# Patient Record
Sex: FEMALE | URBAN - METROPOLITAN AREA
[De-identification: names, ages, dates, MRNs, and addresses within clinical notes are randomized per-mention and may not be internally consistent; named-entity substitution may affect disease eponyms.]

---

## 2022-08-30 ENCOUNTER — LAB REQUISITION (OUTPATIENT)
Dept: LAB | Facility: CLINIC | Age: 67
End: 2022-08-30

## 2022-08-30 DIAGNOSIS — E11.42 TYPE 2 DIABETES MELLITUS WITH DIABETIC POLYNEUROPATHY (H): ICD-10-CM

## 2022-08-30 DIAGNOSIS — E66.01 MORBID (SEVERE) OBESITY DUE TO EXCESS CALORIES (H): ICD-10-CM

## 2022-08-30 DIAGNOSIS — I50.23 ACUTE ON CHRONIC SYSTOLIC (CONGESTIVE) HEART FAILURE (H): ICD-10-CM

## 2022-09-02 LAB
ANION GAP SERPL CALCULATED.3IONS-SCNC: 10 MMOL/L (ref 7–15)
BUN SERPL-MCNC: 41.7 MG/DL (ref 8–23)
CALCIUM SERPL-MCNC: 11.1 MG/DL (ref 8.8–10.2)
CHLORIDE SERPL-SCNC: 98 MMOL/L (ref 98–107)
CREAT SERPL-MCNC: 1.5 MG/DL (ref 0.51–0.95)
DEPRECATED HCO3 PLAS-SCNC: 25 MMOL/L (ref 22–29)
ERYTHROCYTE [DISTWIDTH] IN BLOOD BY AUTOMATED COUNT: 22.1 % (ref 10–15)
GFR SERPL CREATININE-BSD FRML MDRD: 38 ML/MIN/1.73M2
GLUCOSE SERPL-MCNC: 117 MG/DL (ref 70–99)
HCT VFR BLD AUTO: 35.4 % (ref 35–47)
HGB BLD-MCNC: 10 G/DL (ref 11.7–15.7)
MCH RBC QN AUTO: 20.6 PG (ref 26.5–33)
MCHC RBC AUTO-ENTMCNC: 28.2 G/DL (ref 31.5–36.5)
MCV RBC AUTO: 73 FL (ref 78–100)
PLATELET # BLD AUTO: 355 10E3/UL (ref 150–450)
POTASSIUM SERPL-SCNC: 4.8 MMOL/L (ref 3.4–5.3)
RBC # BLD AUTO: 4.85 10E6/UL (ref 3.8–5.2)
SODIUM SERPL-SCNC: 133 MMOL/L (ref 136–145)
WBC # BLD AUTO: 4 10E3/UL (ref 4–11)

## 2022-09-02 PROCEDURE — 85018 HEMOGLOBIN: CPT | Performed by: NURSE PRACTITIONER

## 2022-09-02 PROCEDURE — 36415 COLL VENOUS BLD VENIPUNCTURE: CPT | Performed by: NURSE PRACTITIONER

## 2022-09-02 PROCEDURE — P9604 ONE-WAY ALLOW PRORATED TRIP: HCPCS | Performed by: NURSE PRACTITIONER

## 2022-09-02 PROCEDURE — 82310 ASSAY OF CALCIUM: CPT | Performed by: NURSE PRACTITIONER

## 2022-09-03 ENCOUNTER — LAB REQUISITION (OUTPATIENT)
Dept: LAB | Facility: CLINIC | Age: 67
End: 2022-09-03

## 2022-09-03 DIAGNOSIS — I12.9 HYPERTENSIVE CHRONIC KIDNEY DISEASE WITH STAGE 1 THROUGH STAGE 4 CHRONIC KIDNEY DISEASE, OR UNSPECIFIED CHRONIC KIDNEY DISEASE: ICD-10-CM

## 2022-09-06 LAB
ANION GAP SERPL CALCULATED.3IONS-SCNC: 11 MMOL/L (ref 7–15)
BUN SERPL-MCNC: 47.6 MG/DL (ref 8–23)
CALCIUM SERPL-MCNC: 11.5 MG/DL (ref 8.8–10.2)
CHLORIDE SERPL-SCNC: 100 MMOL/L (ref 98–107)
CREAT SERPL-MCNC: 1.37 MG/DL (ref 0.51–0.95)
DEPRECATED HCO3 PLAS-SCNC: 26 MMOL/L (ref 22–29)
GFR SERPL CREATININE-BSD FRML MDRD: 42 ML/MIN/1.73M2
GLUCOSE SERPL-MCNC: 89 MG/DL (ref 70–99)
POTASSIUM SERPL-SCNC: 5.2 MMOL/L (ref 3.4–5.3)
SODIUM SERPL-SCNC: 137 MMOL/L (ref 136–145)

## 2022-09-06 PROCEDURE — P9604 ONE-WAY ALLOW PRORATED TRIP: HCPCS | Performed by: INTERNAL MEDICINE

## 2022-09-06 PROCEDURE — 36415 COLL VENOUS BLD VENIPUNCTURE: CPT | Performed by: INTERNAL MEDICINE

## 2022-09-06 PROCEDURE — 80048 BASIC METABOLIC PNL TOTAL CA: CPT | Performed by: INTERNAL MEDICINE

## 2022-09-07 ENCOUNTER — LAB REQUISITION (OUTPATIENT)
Dept: LAB | Facility: CLINIC | Age: 67
End: 2022-09-07

## 2022-09-07 DIAGNOSIS — E87.6 HYPOKALEMIA: ICD-10-CM

## 2022-09-07 DIAGNOSIS — E11.42 TYPE 2 DIABETES MELLITUS WITH DIABETIC POLYNEUROPATHY (H): ICD-10-CM

## 2022-09-07 DIAGNOSIS — I12.9 HYPERTENSIVE CHRONIC KIDNEY DISEASE WITH STAGE 1 THROUGH STAGE 4 CHRONIC KIDNEY DISEASE, OR UNSPECIFIED CHRONIC KIDNEY DISEASE: ICD-10-CM

## 2022-09-07 DIAGNOSIS — E66.01 MORBID (SEVERE) OBESITY DUE TO EXCESS CALORIES (H): ICD-10-CM

## 2022-09-07 DIAGNOSIS — I50.23 ACUTE ON CHRONIC SYSTOLIC (CONGESTIVE) HEART FAILURE (H): ICD-10-CM

## 2022-09-07 DIAGNOSIS — E83.42 HYPOMAGNESEMIA: ICD-10-CM

## 2022-09-08 LAB
ANION GAP SERPL CALCULATED.3IONS-SCNC: 13 MMOL/L (ref 7–15)
BUN SERPL-MCNC: 53.6 MG/DL (ref 8–23)
CALCIUM SERPL-MCNC: 10.8 MG/DL (ref 8.8–10.2)
CHLORIDE SERPL-SCNC: 100 MMOL/L (ref 98–107)
CREAT SERPL-MCNC: 1.4 MG/DL (ref 0.51–0.95)
DEPRECATED HCO3 PLAS-SCNC: 25 MMOL/L (ref 22–29)
GFR SERPL CREATININE-BSD FRML MDRD: 41 ML/MIN/1.73M2
GLUCOSE SERPL-MCNC: 77 MG/DL (ref 70–99)
POTASSIUM SERPL-SCNC: 4.5 MMOL/L (ref 3.4–5.3)
SODIUM SERPL-SCNC: 138 MMOL/L (ref 136–145)

## 2022-09-08 PROCEDURE — P9603 ONE-WAY ALLOW PRORATED MILES: HCPCS | Performed by: INTERNAL MEDICINE

## 2022-09-08 PROCEDURE — 80048 BASIC METABOLIC PNL TOTAL CA: CPT | Performed by: INTERNAL MEDICINE

## 2022-09-08 PROCEDURE — 36415 COLL VENOUS BLD VENIPUNCTURE: CPT | Performed by: INTERNAL MEDICINE

## 2022-11-15 ENCOUNTER — LAB REQUISITION (OUTPATIENT)
Dept: LAB | Facility: CLINIC | Age: 67
End: 2022-11-15
Payer: MEDICARE

## 2022-11-15 DIAGNOSIS — I50.22 CHRONIC SYSTOLIC (CONGESTIVE) HEART FAILURE (H): ICD-10-CM

## 2022-11-17 LAB
ANION GAP SERPL CALCULATED.3IONS-SCNC: 15 MMOL/L (ref 7–15)
BUN SERPL-MCNC: 35.7 MG/DL (ref 8–23)
CALCIUM SERPL-MCNC: 11.2 MG/DL (ref 8.8–10.2)
CHLORIDE SERPL-SCNC: 98 MMOL/L (ref 98–107)
CREAT SERPL-MCNC: 1.43 MG/DL (ref 0.51–0.95)
DEPRECATED HCO3 PLAS-SCNC: 24 MMOL/L (ref 22–29)
GFR SERPL CREATININE-BSD FRML MDRD: 40 ML/MIN/1.73M2
GLUCOSE SERPL-MCNC: 73 MG/DL (ref 70–99)
POTASSIUM SERPL-SCNC: 4.6 MMOL/L (ref 3.4–5.3)
SODIUM SERPL-SCNC: 137 MMOL/L (ref 136–145)

## 2022-11-17 PROCEDURE — 36415 COLL VENOUS BLD VENIPUNCTURE: CPT | Performed by: INTERNAL MEDICINE

## 2022-11-17 PROCEDURE — 80048 BASIC METABOLIC PNL TOTAL CA: CPT | Performed by: INTERNAL MEDICINE

## 2022-11-17 PROCEDURE — P9604 ONE-WAY ALLOW PRORATED TRIP: HCPCS | Performed by: INTERNAL MEDICINE

## 2022-11-19 ENCOUNTER — LAB REQUISITION (OUTPATIENT)
Dept: LAB | Facility: CLINIC | Age: 67
End: 2022-11-19
Payer: MEDICARE

## 2022-11-19 DIAGNOSIS — I50.43 ACUTE ON CHRONIC COMBINED SYSTOLIC (CONGESTIVE) AND DIASTOLIC (CONGESTIVE) HEART FAILURE (H): ICD-10-CM

## 2022-11-21 LAB
ANION GAP SERPL CALCULATED.3IONS-SCNC: 16 MMOL/L (ref 7–15)
BUN SERPL-MCNC: 56 MG/DL (ref 8–23)
CALCIUM SERPL-MCNC: 11.6 MG/DL (ref 8.8–10.2)
CHLORIDE SERPL-SCNC: 97 MMOL/L (ref 98–107)
CREAT SERPL-MCNC: 2.03 MG/DL (ref 0.51–0.95)
DEPRECATED HCO3 PLAS-SCNC: 21 MMOL/L (ref 22–29)
ERYTHROCYTE [DISTWIDTH] IN BLOOD BY AUTOMATED COUNT: 20.8 % (ref 10–15)
GFR SERPL CREATININE-BSD FRML MDRD: 26 ML/MIN/1.73M2
GLUCOSE SERPL-MCNC: 101 MG/DL (ref 70–99)
HCT VFR BLD AUTO: 38.4 % (ref 35–47)
HGB BLD-MCNC: 11.5 G/DL (ref 11.7–15.7)
MAGNESIUM SERPL-MCNC: 2.5 MG/DL (ref 1.7–2.3)
MCH RBC QN AUTO: 23.4 PG (ref 26.5–33)
MCHC RBC AUTO-ENTMCNC: 29.9 G/DL (ref 31.5–36.5)
MCV RBC AUTO: 78 FL (ref 78–100)
PLATELET # BLD AUTO: 345 10E3/UL (ref 150–450)
POTASSIUM SERPL-SCNC: 5 MMOL/L (ref 3.4–5.3)
RBC # BLD AUTO: 4.92 10E6/UL (ref 3.8–5.2)
SODIUM SERPL-SCNC: 134 MMOL/L (ref 136–145)
WBC # BLD AUTO: 4.5 10E3/UL (ref 4–11)

## 2022-11-21 PROCEDURE — 36415 COLL VENOUS BLD VENIPUNCTURE: CPT | Performed by: NURSE PRACTITIONER

## 2022-11-21 PROCEDURE — P9604 ONE-WAY ALLOW PRORATED TRIP: HCPCS | Performed by: NURSE PRACTITIONER

## 2022-11-21 PROCEDURE — 85014 HEMATOCRIT: CPT | Performed by: NURSE PRACTITIONER

## 2022-11-21 PROCEDURE — 80048 BASIC METABOLIC PNL TOTAL CA: CPT | Performed by: NURSE PRACTITIONER

## 2022-11-21 PROCEDURE — 83735 ASSAY OF MAGNESIUM: CPT | Performed by: NURSE PRACTITIONER

## 2022-11-22 ENCOUNTER — LAB REQUISITION (OUTPATIENT)
Dept: LAB | Facility: CLINIC | Age: 67
End: 2022-11-22
Payer: MEDICARE

## 2022-11-22 DIAGNOSIS — I12.9 HYPERTENSIVE CHRONIC KIDNEY DISEASE WITH STAGE 1 THROUGH STAGE 4 CHRONIC KIDNEY DISEASE, OR UNSPECIFIED CHRONIC KIDNEY DISEASE: ICD-10-CM

## 2022-11-23 LAB
ANION GAP SERPL CALCULATED.3IONS-SCNC: 16 MMOL/L (ref 7–15)
BUN SERPL-MCNC: 71.7 MG/DL (ref 8–23)
CALCIUM SERPL-MCNC: 10.9 MG/DL (ref 8.8–10.2)
CHLORIDE SERPL-SCNC: 99 MMOL/L (ref 98–107)
CREAT SERPL-MCNC: 2.1 MG/DL (ref 0.51–0.95)
DEPRECATED HCO3 PLAS-SCNC: 20 MMOL/L (ref 22–29)
GFR SERPL CREATININE-BSD FRML MDRD: 25 ML/MIN/1.73M2
GLUCOSE SERPL-MCNC: 98 MG/DL (ref 70–99)
MAGNESIUM SERPL-MCNC: 2.7 MG/DL (ref 1.7–2.3)
POTASSIUM SERPL-SCNC: 4.5 MMOL/L (ref 3.4–5.3)
SODIUM SERPL-SCNC: 135 MMOL/L (ref 136–145)

## 2022-11-23 PROCEDURE — 82310 ASSAY OF CALCIUM: CPT | Performed by: NURSE PRACTITIONER

## 2022-11-23 PROCEDURE — 36415 COLL VENOUS BLD VENIPUNCTURE: CPT | Performed by: NURSE PRACTITIONER

## 2022-11-23 PROCEDURE — 83735 ASSAY OF MAGNESIUM: CPT | Performed by: NURSE PRACTITIONER

## 2022-11-23 PROCEDURE — P9604 ONE-WAY ALLOW PRORATED TRIP: HCPCS | Mod: ORL | Performed by: NURSE PRACTITIONER

## 2022-11-24 ENCOUNTER — LAB REQUISITION (OUTPATIENT)
Dept: LAB | Facility: CLINIC | Age: 67
End: 2022-11-24
Payer: MEDICARE

## 2022-11-24 DIAGNOSIS — I12.9 HYPERTENSIVE CHRONIC KIDNEY DISEASE WITH STAGE 1 THROUGH STAGE 4 CHRONIC KIDNEY DISEASE, OR UNSPECIFIED CHRONIC KIDNEY DISEASE: ICD-10-CM

## 2022-11-25 LAB
ANION GAP SERPL CALCULATED.3IONS-SCNC: 17 MMOL/L (ref 7–15)
BUN SERPL-MCNC: 80.3 MG/DL (ref 8–23)
CALCIUM SERPL-MCNC: 11.1 MG/DL (ref 8.8–10.2)
CHLORIDE SERPL-SCNC: 102 MMOL/L (ref 98–107)
CREAT SERPL-MCNC: 1.95 MG/DL (ref 0.51–0.95)
DEPRECATED HCO3 PLAS-SCNC: 18 MMOL/L (ref 22–29)
GFR SERPL CREATININE-BSD FRML MDRD: 28 ML/MIN/1.73M2
GLUCOSE SERPL-MCNC: 95 MG/DL (ref 70–99)
POTASSIUM SERPL-SCNC: 4.4 MMOL/L (ref 3.4–5.3)
SODIUM SERPL-SCNC: 137 MMOL/L (ref 136–145)

## 2022-11-25 PROCEDURE — 36415 COLL VENOUS BLD VENIPUNCTURE: CPT | Mod: ORL | Performed by: NURSE PRACTITIONER

## 2022-11-25 PROCEDURE — 80048 BASIC METABOLIC PNL TOTAL CA: CPT | Mod: ORL | Performed by: NURSE PRACTITIONER

## 2022-11-25 PROCEDURE — P9604 ONE-WAY ALLOW PRORATED TRIP: HCPCS | Mod: ORL | Performed by: NURSE PRACTITIONER

## 2023-12-16 ENCOUNTER — LAB REQUISITION (OUTPATIENT)
Dept: LAB | Facility: CLINIC | Age: 68
End: 2023-12-16
Payer: MEDICARE

## 2023-12-16 DIAGNOSIS — I50.43 ACUTE ON CHRONIC COMBINED SYSTOLIC (CONGESTIVE) AND DIASTOLIC (CONGESTIVE) HEART FAILURE (H): ICD-10-CM

## 2023-12-18 LAB
ALBUMIN SERPL BCG-MCNC: 4.2 G/DL (ref 3.5–5.2)
ALP SERPL-CCNC: 154 U/L (ref 40–150)
ALT SERPL W P-5'-P-CCNC: 8 U/L (ref 0–50)
ANION GAP SERPL CALCULATED.3IONS-SCNC: 12 MMOL/L (ref 7–15)
AST SERPL W P-5'-P-CCNC: 24 U/L (ref 0–45)
BILIRUB DIRECT SERPL-MCNC: ABNORMAL MG/DL
BILIRUB SERPL-MCNC: 0.7 MG/DL
BUN SERPL-MCNC: 39.7 MG/DL (ref 8–23)
CALCIUM SERPL-MCNC: 10.7 MG/DL (ref 8.8–10.2)
CHLORIDE SERPL-SCNC: 99 MMOL/L (ref 98–107)
CREAT SERPL-MCNC: 1.35 MG/DL (ref 0.51–0.95)
DEPRECATED HCO3 PLAS-SCNC: 26 MMOL/L (ref 22–29)
EGFRCR SERPLBLD CKD-EPI 2021: 43 ML/MIN/1.73M2
GLUCOSE SERPL-MCNC: 99 MG/DL (ref 70–99)
POTASSIUM SERPL-SCNC: 4.2 MMOL/L (ref 3.4–5.3)
PROT SERPL-MCNC: 8.4 G/DL (ref 6.4–8.3)
SODIUM SERPL-SCNC: 137 MMOL/L (ref 135–145)

## 2023-12-18 PROCEDURE — 36415 COLL VENOUS BLD VENIPUNCTURE: CPT | Performed by: INTERNAL MEDICINE

## 2023-12-18 PROCEDURE — P9603 ONE-WAY ALLOW PRORATED MILES: HCPCS | Performed by: INTERNAL MEDICINE

## 2023-12-18 PROCEDURE — 80053 COMPREHEN METABOLIC PANEL: CPT | Performed by: INTERNAL MEDICINE

## 2023-12-19 ENCOUNTER — LAB REQUISITION (OUTPATIENT)
Dept: LAB | Facility: CLINIC | Age: 68
End: 2023-12-19
Payer: MEDICARE

## 2023-12-19 DIAGNOSIS — I50.43 ACUTE ON CHRONIC COMBINED SYSTOLIC (CONGESTIVE) AND DIASTOLIC (CONGESTIVE) HEART FAILURE (H): ICD-10-CM

## 2023-12-20 PROCEDURE — 36415 COLL VENOUS BLD VENIPUNCTURE: CPT | Performed by: INTERNAL MEDICINE

## 2023-12-20 PROCEDURE — 80048 BASIC METABOLIC PNL TOTAL CA: CPT | Performed by: INTERNAL MEDICINE

## 2023-12-20 PROCEDURE — P9604 ONE-WAY ALLOW PRORATED TRIP: HCPCS | Performed by: INTERNAL MEDICINE

## 2023-12-21 LAB
ANION GAP SERPL CALCULATED.3IONS-SCNC: 10 MMOL/L (ref 7–15)
BUN SERPL-MCNC: 38.4 MG/DL (ref 8–23)
CALCIUM SERPL-MCNC: 10.7 MG/DL (ref 8.8–10.2)
CHLORIDE SERPL-SCNC: 103 MMOL/L (ref 98–107)
CREAT SERPL-MCNC: 1.22 MG/DL (ref 0.51–0.95)
DEPRECATED HCO3 PLAS-SCNC: 26 MMOL/L (ref 22–29)
EGFRCR SERPLBLD CKD-EPI 2021: 48 ML/MIN/1.73M2
GLUCOSE SERPL-MCNC: 131 MG/DL (ref 70–99)
POTASSIUM SERPL-SCNC: 4.2 MMOL/L (ref 3.4–5.3)
SODIUM SERPL-SCNC: 139 MMOL/L (ref 135–145)

## 2024-03-02 ENCOUNTER — LAB REQUISITION (OUTPATIENT)
Dept: LAB | Facility: CLINIC | Age: 69
End: 2024-03-02
Payer: MEDICARE

## 2024-03-02 DIAGNOSIS — R53.1 WEAKNESS: ICD-10-CM

## 2024-03-04 LAB
ANION GAP SERPL CALCULATED.3IONS-SCNC: 11 MMOL/L (ref 7–15)
BUN SERPL-MCNC: 28.2 MG/DL (ref 8–23)
CALCIUM SERPL-MCNC: 11 MG/DL (ref 8.8–10.2)
CHLORIDE SERPL-SCNC: 99 MMOL/L (ref 98–107)
CREAT SERPL-MCNC: 0.96 MG/DL (ref 0.51–0.95)
DEPRECATED HCO3 PLAS-SCNC: 25 MMOL/L (ref 22–29)
EGFRCR SERPLBLD CKD-EPI 2021: 64 ML/MIN/1.73M2
GLUCOSE SERPL-MCNC: 93 MG/DL (ref 70–99)
POTASSIUM SERPL-SCNC: 4.2 MMOL/L (ref 3.4–5.3)
SODIUM SERPL-SCNC: 135 MMOL/L (ref 135–145)

## 2024-03-04 PROCEDURE — 80048 BASIC METABOLIC PNL TOTAL CA: CPT | Performed by: INTERNAL MEDICINE

## 2024-03-04 PROCEDURE — P9604 ONE-WAY ALLOW PRORATED TRIP: HCPCS | Performed by: INTERNAL MEDICINE

## 2024-03-04 PROCEDURE — 36415 COLL VENOUS BLD VENIPUNCTURE: CPT | Performed by: INTERNAL MEDICINE

## 2024-03-14 ENCOUNTER — LAB REQUISITION (OUTPATIENT)
Dept: LAB | Facility: CLINIC | Age: 69
End: 2024-03-14
Payer: MEDICARE

## 2024-03-14 DIAGNOSIS — M85.89 OTHER SPECIFIED DISORDERS OF BONE DENSITY AND STRUCTURE, MULTIPLE SITES: ICD-10-CM

## 2024-03-14 DIAGNOSIS — S82.51XD DISPLACED FRACTURE OF MEDIAL MALLEOLUS OF RIGHT TIBIA, SUBSEQUENT ENCOUNTER FOR CLOSED FRACTURE WITH ROUTINE HEALING: ICD-10-CM

## 2024-03-14 DIAGNOSIS — R11.2 NAUSEA WITH VOMITING, UNSPECIFIED: ICD-10-CM

## 2024-03-15 PROCEDURE — 36415 COLL VENOUS BLD VENIPUNCTURE: CPT | Performed by: INTERNAL MEDICINE

## 2024-03-15 PROCEDURE — 80048 BASIC METABOLIC PNL TOTAL CA: CPT | Performed by: INTERNAL MEDICINE

## 2024-03-15 PROCEDURE — P9603 ONE-WAY ALLOW PRORATED MILES: HCPCS | Performed by: INTERNAL MEDICINE

## 2024-03-16 LAB
ANION GAP SERPL CALCULATED.3IONS-SCNC: 10 MMOL/L (ref 7–15)
BUN SERPL-MCNC: 34.4 MG/DL (ref 8–23)
CALCIUM SERPL-MCNC: 11.4 MG/DL (ref 8.8–10.2)
CHLORIDE SERPL-SCNC: 104 MMOL/L (ref 98–107)
CREAT SERPL-MCNC: 1.09 MG/DL (ref 0.51–0.95)
DEPRECATED HCO3 PLAS-SCNC: 24 MMOL/L (ref 22–29)
EGFRCR SERPLBLD CKD-EPI 2021: 55 ML/MIN/1.73M2
GLUCOSE SERPL-MCNC: 152 MG/DL (ref 70–99)
POTASSIUM SERPL-SCNC: 4.1 MMOL/L (ref 3.4–5.3)
SODIUM SERPL-SCNC: 138 MMOL/L (ref 135–145)

## 2024-03-26 ENCOUNTER — LAB REQUISITION (OUTPATIENT)
Dept: LAB | Facility: CLINIC | Age: 69
End: 2024-03-26
Payer: MEDICARE

## 2024-03-26 DIAGNOSIS — I48.0 PAROXYSMAL ATRIAL FIBRILLATION (H): ICD-10-CM

## 2024-03-27 LAB
ANION GAP SERPL CALCULATED.3IONS-SCNC: 12 MMOL/L (ref 7–15)
BUN SERPL-MCNC: 33.1 MG/DL (ref 8–23)
CALCIUM SERPL-MCNC: 12 MG/DL (ref 8.8–10.2)
CHLORIDE SERPL-SCNC: 101 MMOL/L (ref 98–107)
CREAT SERPL-MCNC: 1 MG/DL (ref 0.51–0.95)
DEPRECATED HCO3 PLAS-SCNC: 24 MMOL/L (ref 22–29)
EGFRCR SERPLBLD CKD-EPI 2021: 61 ML/MIN/1.73M2
GLUCOSE SERPL-MCNC: 172 MG/DL (ref 70–99)
POTASSIUM SERPL-SCNC: 3.7 MMOL/L (ref 3.4–5.3)
SODIUM SERPL-SCNC: 137 MMOL/L (ref 135–145)

## 2024-03-27 PROCEDURE — 80048 BASIC METABOLIC PNL TOTAL CA: CPT | Mod: ORL

## 2024-03-27 PROCEDURE — 36415 COLL VENOUS BLD VENIPUNCTURE: CPT | Mod: ORL

## 2024-03-27 PROCEDURE — P9604 ONE-WAY ALLOW PRORATED TRIP: HCPCS | Mod: ORL

## 2024-04-05 ENCOUNTER — LAB REQUISITION (OUTPATIENT)
Dept: LAB | Facility: CLINIC | Age: 69
End: 2024-04-05
Payer: MEDICARE

## 2024-04-05 DIAGNOSIS — R11.2 NAUSEA WITH VOMITING, UNSPECIFIED: ICD-10-CM

## 2024-04-05 DIAGNOSIS — S82.51XD DISPLACED FRACTURE OF MEDIAL MALLEOLUS OF RIGHT TIBIA, SUBSEQUENT ENCOUNTER FOR CLOSED FRACTURE WITH ROUTINE HEALING: ICD-10-CM

## 2024-04-05 DIAGNOSIS — M85.89 OTHER SPECIFIED DISORDERS OF BONE DENSITY AND STRUCTURE, MULTIPLE SITES: ICD-10-CM

## 2024-04-08 PROCEDURE — P9603 ONE-WAY ALLOW PRORATED MILES: HCPCS | Mod: ORL

## 2024-04-08 PROCEDURE — 80048 BASIC METABOLIC PNL TOTAL CA: CPT | Mod: ORL

## 2024-04-08 PROCEDURE — 36415 COLL VENOUS BLD VENIPUNCTURE: CPT | Mod: ORL

## 2024-04-09 LAB
ANION GAP SERPL CALCULATED.3IONS-SCNC: 6 MMOL/L (ref 7–15)
BUN SERPL-MCNC: 20.5 MG/DL (ref 8–23)
CALCIUM SERPL-MCNC: 13.1 MG/DL (ref 8.8–10.2)
CHLORIDE SERPL-SCNC: 108 MMOL/L (ref 98–107)
CREAT SERPL-MCNC: 0.92 MG/DL (ref 0.51–0.95)
DEPRECATED HCO3 PLAS-SCNC: 25 MMOL/L (ref 22–29)
EGFRCR SERPLBLD CKD-EPI 2021: 67 ML/MIN/1.73M2
GLUCOSE SERPL-MCNC: 126 MG/DL (ref 70–99)
POTASSIUM SERPL-SCNC: 4.7 MMOL/L (ref 3.4–5.3)
SODIUM SERPL-SCNC: 139 MMOL/L (ref 135–145)

## 2024-04-11 ENCOUNTER — LAB REQUISITION (OUTPATIENT)
Dept: LAB | Facility: CLINIC | Age: 69
End: 2024-04-11
Payer: MEDICARE

## 2024-04-11 DIAGNOSIS — R11.2 NAUSEA WITH VOMITING, UNSPECIFIED: ICD-10-CM

## 2024-04-11 DIAGNOSIS — M85.89 OTHER SPECIFIED DISORDERS OF BONE DENSITY AND STRUCTURE, MULTIPLE SITES: ICD-10-CM

## 2024-04-11 DIAGNOSIS — S82.51XD DISPLACED FRACTURE OF MEDIAL MALLEOLUS OF RIGHT TIBIA, SUBSEQUENT ENCOUNTER FOR CLOSED FRACTURE WITH ROUTINE HEALING: ICD-10-CM

## 2024-04-12 LAB
ANION GAP SERPL CALCULATED.3IONS-SCNC: 11 MMOL/L (ref 7–15)
BUN SERPL-MCNC: 24.4 MG/DL (ref 8–23)
CALCIUM SERPL-MCNC: 13.1 MG/DL (ref 8.8–10.2)
CHLORIDE SERPL-SCNC: 106 MMOL/L (ref 98–107)
CREAT SERPL-MCNC: 0.87 MG/DL (ref 0.51–0.95)
DEPRECATED HCO3 PLAS-SCNC: 20 MMOL/L (ref 22–29)
EGFRCR SERPLBLD CKD-EPI 2021: 72 ML/MIN/1.73M2
GLUCOSE SERPL-MCNC: 89 MG/DL (ref 70–99)
POTASSIUM SERPL-SCNC: 4.8 MMOL/L (ref 3.4–5.3)
SODIUM SERPL-SCNC: 137 MMOL/L (ref 135–145)

## 2024-04-12 PROCEDURE — 80048 BASIC METABOLIC PNL TOTAL CA: CPT | Mod: ORL | Performed by: INTERNAL MEDICINE

## 2024-04-12 PROCEDURE — 36415 COLL VENOUS BLD VENIPUNCTURE: CPT | Mod: ORL | Performed by: INTERNAL MEDICINE

## 2024-04-12 PROCEDURE — P9604 ONE-WAY ALLOW PRORATED TRIP: HCPCS | Mod: ORL | Performed by: INTERNAL MEDICINE

## 2024-04-25 ENCOUNTER — LAB REQUISITION (OUTPATIENT)
Dept: LAB | Facility: CLINIC | Age: 69
End: 2024-04-25
Payer: MEDICARE

## 2024-04-25 DIAGNOSIS — I50.43 ACUTE ON CHRONIC COMBINED SYSTOLIC (CONGESTIVE) AND DIASTOLIC (CONGESTIVE) HEART FAILURE (H): ICD-10-CM

## 2024-04-26 ENCOUNTER — LAB REQUISITION (OUTPATIENT)
Dept: LAB | Facility: CLINIC | Age: 69
End: 2024-04-26
Payer: MEDICARE

## 2024-04-26 DIAGNOSIS — N18.30 CHRONIC KIDNEY DISEASE, STAGE 3 UNSPECIFIED (H): ICD-10-CM

## 2024-04-26 LAB
ANION GAP SERPL CALCULATED.3IONS-SCNC: 8 MMOL/L (ref 7–15)
BUN SERPL-MCNC: 18.4 MG/DL (ref 8–23)
CALCIUM SERPL-MCNC: 12.6 MG/DL (ref 8.8–10.2)
CHLORIDE SERPL-SCNC: 107 MMOL/L (ref 98–107)
CREAT SERPL-MCNC: 0.8 MG/DL (ref 0.51–0.95)
DEPRECATED HCO3 PLAS-SCNC: 24 MMOL/L (ref 22–29)
EGFRCR SERPLBLD CKD-EPI 2021: 80 ML/MIN/1.73M2
GLUCOSE SERPL-MCNC: 96 MG/DL (ref 70–99)
POTASSIUM SERPL-SCNC: 4.6 MMOL/L (ref 3.4–5.3)
SODIUM SERPL-SCNC: 139 MMOL/L (ref 135–145)

## 2024-04-26 PROCEDURE — 80048 BASIC METABOLIC PNL TOTAL CA: CPT

## 2024-04-26 PROCEDURE — P9604 ONE-WAY ALLOW PRORATED TRIP: HCPCS

## 2024-04-26 PROCEDURE — 36415 COLL VENOUS BLD VENIPUNCTURE: CPT

## 2024-04-29 LAB — VIT D+METAB SERPL-MCNC: 30 NG/ML (ref 20–50)

## 2024-04-29 PROCEDURE — P9604 ONE-WAY ALLOW PRORATED TRIP: HCPCS

## 2024-04-29 PROCEDURE — 36415 COLL VENOUS BLD VENIPUNCTURE: CPT

## 2024-04-29 PROCEDURE — 82306 VITAMIN D 25 HYDROXY: CPT

## 2024-04-30 ENCOUNTER — LAB REQUISITION (OUTPATIENT)
Dept: LAB | Facility: CLINIC | Age: 69
End: 2024-04-30
Payer: MEDICARE

## 2024-04-30 DIAGNOSIS — I50.43 ACUTE ON CHRONIC COMBINED SYSTOLIC (CONGESTIVE) AND DIASTOLIC (CONGESTIVE) HEART FAILURE (H): ICD-10-CM

## 2024-05-01 LAB
ERYTHROCYTE [DISTWIDTH] IN BLOOD BY AUTOMATED COUNT: 15.3 % (ref 10–15)
HCT VFR BLD AUTO: 35.3 % (ref 35–47)
HGB BLD-MCNC: 11.8 G/DL (ref 11.7–15.7)
MCH RBC QN AUTO: 28 PG (ref 26.5–33)
MCHC RBC AUTO-ENTMCNC: 33.4 G/DL (ref 31.5–36.5)
MCV RBC AUTO: 84 FL (ref 78–100)
PLATELET # BLD AUTO: 202 10E3/UL (ref 150–450)
RBC # BLD AUTO: 4.21 10E6/UL (ref 3.8–5.2)
WBC # BLD AUTO: 7 10E3/UL (ref 4–11)

## 2024-05-01 PROCEDURE — 36415 COLL VENOUS BLD VENIPUNCTURE: CPT

## 2024-05-01 PROCEDURE — P9604 ONE-WAY ALLOW PRORATED TRIP: HCPCS

## 2024-05-01 PROCEDURE — 85041 AUTOMATED RBC COUNT: CPT

## 2024-09-04 ENCOUNTER — DOCUMENTATION ONLY (OUTPATIENT)
Dept: GERIATRICS | Facility: CLINIC | Age: 69
End: 2024-09-04
Payer: MEDICARE

## 2024-09-04 VITALS
BODY MASS INDEX: 49.69 KG/M2 | TEMPERATURE: 98.7 F | HEIGHT: 62 IN | OXYGEN SATURATION: 100 % | HEART RATE: 71 BPM | RESPIRATION RATE: 18 BRPM | WEIGHT: 270 LBS | SYSTOLIC BLOOD PRESSURE: 124 MMHG | DIASTOLIC BLOOD PRESSURE: 76 MMHG

## 2024-09-04 PROBLEM — R35.1 FREQUENT URINATION AT NIGHT: Status: ACTIVE | Noted: 2020-09-30

## 2024-09-04 PROBLEM — J96.01 ACUTE RESPIRATORY FAILURE WITH HYPOXIA (H): Status: ACTIVE | Noted: 2023-12-04

## 2024-09-04 PROBLEM — F43.23 ADJUSTMENT DISORDER WITH MIXED ANXIETY AND DEPRESSED MOOD: Status: ACTIVE | Noted: 2020-09-30

## 2024-09-04 PROBLEM — M85.9 LOW BONE DENSITY: Status: ACTIVE | Noted: 2024-02-28

## 2024-09-04 PROBLEM — S82.842D BIMALLEOLAR ANKLE FRACTURE, LEFT, CLOSED, WITH ROUTINE HEALING, SUBSEQUENT ENCOUNTER: Status: ACTIVE | Noted: 2024-02-27

## 2024-09-04 PROBLEM — Z90.11 H/O RIGHT MASTECTOMY: Status: ACTIVE | Noted: 2020-09-30

## 2024-09-04 PROBLEM — N28.9 RENAL INSUFFICIENCY SYNDROME: Status: ACTIVE | Noted: 2020-05-01

## 2024-09-04 PROBLEM — I05.9 RHEUMATIC MITRAL VALVE DISEASE: Status: ACTIVE | Noted: 2020-09-30

## 2024-09-04 PROBLEM — Z86.79 PERSONAL HISTORY OF OTHER DISEASES OF THE CIRCULATORY SYSTEM: Status: ACTIVE | Noted: 2020-09-30

## 2024-09-04 PROBLEM — I07.1 TRICUSPID REGURGITATION: Status: ACTIVE | Noted: 2020-09-30

## 2024-09-04 PROBLEM — Z74.09 LIMITED MOBILITY: Status: ACTIVE | Noted: 2021-01-12

## 2024-09-04 PROBLEM — R52 PAIN: Status: ACTIVE | Noted: 2024-02-20

## 2024-09-04 PROBLEM — I89.0 LYMPHEDEMA IN ADULT PATIENT: Status: ACTIVE | Noted: 2023-09-06

## 2024-09-04 PROBLEM — I50.9 ACUTE EXACERBATION OF CONGESTIVE HEART FAILURE (H): Status: ACTIVE | Noted: 2021-02-11

## 2024-09-04 PROBLEM — F51.04 CHRONIC INSOMNIA: Status: ACTIVE | Noted: 2020-09-30

## 2024-09-04 PROBLEM — I50.21 ACUTE SYSTOLIC HEART FAILURE (H): Status: ACTIVE | Noted: 2022-04-22

## 2024-09-04 PROBLEM — Z99.3 WHEELCHAIR DEPENDENCE: Status: ACTIVE | Noted: 2023-07-14

## 2024-09-04 PROBLEM — N17.9 ACUTE KIDNEY INJURY SUPERIMPOSED ON CKD (H): Status: ACTIVE | Noted: 2023-09-12

## 2024-09-04 PROBLEM — R79.89 SERUM CREATININE RAISED: Status: ACTIVE | Noted: 2023-12-06

## 2024-09-04 PROBLEM — E78.5 HYPERLIPIDEMIA: Status: ACTIVE | Noted: 2020-09-30

## 2024-09-04 PROBLEM — Z95.0 S/P CARDIAC PACEMAKER PROCEDURE: Status: ACTIVE | Noted: 2021-06-10

## 2024-09-04 PROBLEM — Z59.00 HOMELESSNESS: Status: ACTIVE | Noted: 2020-09-30

## 2024-09-04 PROBLEM — R05.9 COUGH: Status: ACTIVE | Noted: 2021-02-11

## 2024-09-04 PROBLEM — E55.9 VITAMIN D DEFICIENCY: Status: ACTIVE | Noted: 2024-08-29

## 2024-09-04 PROBLEM — E83.52 HYPERCALCEMIA: Status: ACTIVE | Noted: 2024-08-29

## 2024-09-04 PROBLEM — S82.391K: Status: ACTIVE | Noted: 2024-02-27

## 2024-09-04 PROBLEM — E11.9 TYPE 2 DIABETES MELLITUS WITHOUT COMPLICATION, WITHOUT LONG-TERM CURRENT USE OF INSULIN (H): Status: ACTIVE | Noted: 2024-02-27

## 2024-09-04 PROBLEM — D50.9 IRON DEFICIENCY ANEMIA: Status: ACTIVE | Noted: 2020-12-15

## 2024-09-04 PROBLEM — N18.9 ACUTE KIDNEY INJURY SUPERIMPOSED ON CKD (H): Status: ACTIVE | Noted: 2023-09-12

## 2024-09-04 PROBLEM — I34.0 SEVERE MITRAL REGURGITATION: Status: ACTIVE | Noted: 2023-09-12

## 2024-09-04 PROBLEM — D64.9 ANEMIA: Status: ACTIVE | Noted: 2021-03-10

## 2024-09-04 PROBLEM — Z87.891 PERSONAL HISTORY OF NICOTINE DEPENDENCE: Status: ACTIVE | Noted: 2020-09-30

## 2024-09-04 PROBLEM — Z98.890 S/P ABLATION OF ATRIAL FIBRILLATION: Status: ACTIVE | Noted: 2020-05-19

## 2024-09-04 PROBLEM — I50.43 ACUTE ON CHRONIC COMBINED SYSTOLIC AND DIASTOLIC CHF (CONGESTIVE HEART FAILURE) (H): Status: ACTIVE | Noted: 2019-04-16

## 2024-09-04 PROBLEM — R06.00 DYSPNEA: Status: ACTIVE | Noted: 2024-02-20

## 2024-09-04 PROBLEM — I48.21 PERMANENT ATRIAL FIBRILLATION (H): Status: ACTIVE | Noted: 2018-11-04

## 2024-09-04 PROBLEM — S82.891A CLOSED FRACTURE DISLOCATION OF RIGHT ANKLE JOINT: Status: ACTIVE | Noted: 2024-02-26

## 2024-09-04 PROBLEM — E08.9 DIABETES MELLITUS DUE TO UNDERLYING CONDITION (H): Status: ACTIVE | Noted: 2020-12-16

## 2024-09-04 PROBLEM — I42.8 NON-ISCHEMIC CARDIOMYOPATHY (H): Status: ACTIVE | Noted: 2020-09-30

## 2024-09-04 PROBLEM — I50.32 CHRONIC DIASTOLIC HEART FAILURE (H): Status: ACTIVE | Noted: 2024-02-27

## 2024-09-04 PROBLEM — M62.81 GENERALIZED MUSCLE WEAKNESS: Status: ACTIVE | Noted: 2024-02-20

## 2024-09-04 PROBLEM — Z86.79 S/P ABLATION OF ATRIAL FIBRILLATION: Status: ACTIVE | Noted: 2020-05-19

## 2024-09-04 PROBLEM — R79.89 ELEVATED PARATHYROID HORMONE: Status: ACTIVE | Noted: 2024-08-29

## 2024-09-04 RX ORDER — METHOCARBAMOL 750 MG/1
750 TABLET, FILM COATED ORAL 4 TIMES DAILY PRN
COMMUNITY

## 2024-09-04 RX ORDER — LOSARTAN POTASSIUM 25 MG/1
12.5 TABLET ORAL DAILY
COMMUNITY

## 2024-09-04 RX ORDER — ACETAMINOPHEN 500 MG
500 TABLET ORAL EVERY 6 HOURS PRN
COMMUNITY

## 2024-09-04 RX ORDER — SPIRONOLACTONE 25 MG/1
25 TABLET ORAL DAILY
COMMUNITY

## 2024-09-04 RX ORDER — GUAIFENESIN 200 MG/10ML
10 LIQUID ORAL EVERY 4 HOURS PRN
COMMUNITY

## 2024-09-04 RX ORDER — ALBUTEROL SULFATE 90 UG/1
2 AEROSOL, METERED RESPIRATORY (INHALATION) EVERY 4 HOURS PRN
COMMUNITY

## 2024-09-04 RX ORDER — ONDANSETRON 4 MG/1
4 TABLET, FILM COATED ORAL EVERY 8 HOURS PRN
COMMUNITY

## 2024-09-04 RX ORDER — HYDROXYZINE PAMOATE 25 MG/1
25 CAPSULE ORAL EVERY 6 HOURS PRN
COMMUNITY

## 2024-09-04 RX ORDER — ERGOCALCIFEROL 1.25 MG/1
50000 CAPSULE, LIQUID FILLED ORAL WEEKLY
COMMUNITY

## 2024-09-04 RX ORDER — BUMETANIDE 1 MG/1
1-2 TABLET ORAL DAILY
COMMUNITY

## 2024-09-04 RX ORDER — MINERAL OIL/HYDROPHIL PETROLAT
OINTMENT (GRAM) TOPICAL PRN
COMMUNITY

## 2024-09-04 RX ORDER — METOPROLOL SUCCINATE 50 MG/1
50 TABLET, EXTENDED RELEASE ORAL 2 TIMES DAILY
COMMUNITY

## 2024-09-04 RX ORDER — LANOLIN ALCOHOL/MO/W.PET/CERES
3 CREAM (GRAM) TOPICAL
COMMUNITY

## 2024-09-04 NOTE — PROGRESS NOTES
[facility] GERIATRICS    PRIMARY CARE PROVIDER AND CLINIC:  Provider Outside, No address on file  Chief Complaint   Patient presents with    Hospital F/U      [place] Medical Record Number:  [id number]  Place of Service where encounter took place:  [facility] ([facility]) [[id number]]    Felisa Vasquez  is a 69 year old  (1955), admitted to the above facility from  [facility] . Hospital stay 8/29/2024 through 9/3/2024..   Patient seen today for initial NP visit in the TCU:  1. Acute systolic heart failure (H)    2. Acute kidney injury superimposed on CKD  (H24)    3. Acute respiratory failure with hypoxia (H)    4. Type 2 diabetes mellitus without complication, without long-term current use of insulin (H)    5. Vitamin D deficiency    6. Lymphedema in adult patient          HPI:    Doing well. Offers no additional concerns. Hoping to go home next week. On Bumetanide, Bret and FR. Hospital weight 263 pounds on discharge. Today 270 pounds. Denies HA, chest pain, palpitations, dizziness. HR controlled. BP stable so far in TCU. No troubles breathing, no SOB, no Concerns with urination, no constipation. Eating and drinking okay. Sleeping okay. Denies pain but uses Robaxin PRN and tylenol PRN. Participating in therapies .  Lives at [facility] but would like to move elsewhere, ambulates with .      CODE STATUS/ADVANCE DIRECTIVES DISCUSSION:  Full Code  CPR/Full code   ALLERGIES:   Allergies   Allergen Reactions    Gabapentin Other (See Comments)     Sweating, breathing troubles, palpitation    Oxycodone Nausea     Sweaty, nauseated, light headed and faint    Lisinopril Cough    Paroxetine GI Disturbance      PAST MEDICAL HISTORY: History reviewed. No pertinent past medical history.   PAST SURGICAL HISTORY:   has no past surgical history on file.  FAMILY HISTORY: family history is not on file.  SOCIAL HISTORY:     Patient's living condition: lives alone    Post Discharge Medication  Reconciliation Status:   MED REC REQUIRED  Post Medication Reconciliation Status: discharge medications reconciled and changed, per note/orders       Current Outpatient Medications   Medication Sig Dispense Refill    acetaminophen (TYLENOL) 500 MG tablet Take 500 mg by mouth every 6 hours as needed for mild pain.      albuterol (PROAIR HFA/PROVENTIL HFA/VENTOLIN HFA) 108 (90 Base) MCG/ACT inhaler Inhale 2 puffs into the lungs every 4 hours as needed for shortness of breath, wheezing or cough.      apixaban ANTICOAGULANT (ELIQUIS) 5 MG tablet Take 5 mg by mouth 2 times daily.      benzocaine-menthol (CHLORASEPTIC) 6-10 MG lozenge Place 1 lozenge inside cheek every 2 hours as needed for sore throat.      bumetanide (BUMEX) 1 MG tablet Take 1-2 mg by mouth daily.      cholecalciferol (VITAMIN D3) 25 mcg (1000 units) capsule Take 1 capsule by mouth daily.      diclofenac (VOLTAREN) 1 % topical gel Apply 2 g topically 4 times daily.      empagliflozin (JARDIANCE) 10 MG TABS tablet Take 10 mg by mouth daily.      guaiFENesin (ROBITUSSIN) 20 mg/mL liquid Take 10 mLs by mouth every 4 hours as needed for cough.      hydrOXYzine nay (VISTARIL) 25 MG capsule Take 25 mg by mouth every 6 hours as needed for itching.      ipratropium (ATROVENT HFA) 17 MCG/ACT inhaler Inhale 2 puffs into the lungs every 6 hours.      losartan (COZAAR) 25 MG tablet Take 12.5 mg by mouth daily.      melatonin 3 MG tablet Take 3 mg by mouth nightly as needed for sleep.      methocarbamol (ROBAXIN) 750 MG tablet Take 750 mg by mouth 4 times daily as needed for muscle spasms.      metoprolol succinate ER (TOPROL XL) 50 MG 24 hr tablet Take 50 mg by mouth 2 times daily.      mineral oil-hydrophilic petrolatum (AQUAPHOR) external ointment Apply topically as needed.      ondansetron (ZOFRAN) 4 MG tablet Take 4 mg by mouth every 8 hours as needed for nausea.      spironolactone (ALDACTONE) 25 MG tablet Take 25 mg by mouth daily.      vitamin D2  "(ERGOCALCIFEROL) 65092 units (1250 mcg) capsule Take 50,000 Units by mouth once a week.       No current facility-administered medications for this visit.       ROS:  10 point ROS of systems including Constitutional, Eyes, Respiratory, Cardiovascular, Gastroenterology, Genitourinary, Integumentary, Musculoskeletal, Psychiatric were all negative except for pertinent positives noted in my HPI.    Vitals:  /76   Pulse 71   Temp 98.7  F (37.1  C)   Resp 18   Ht 1.575 m (5' 2\")   Wt 122.5 kg (270 lb)   SpO2 100%   BMI 49.38 kg/m    Exam:  GENERAL APPEARANCE:  Alert, in no distress, oriented, cooperative. Sitting comfortably in chair.   ENT:  Mouth and posterior oropharynx normal, moist mucous membranes  EYES:  EOM, conjunctivae, lids, pupils and irises normal  NECK:  No adenopathy,masses or thyromegaly  RESP:  respiratory effort and palpation of chest normal, lungs clear to auscultation , no respiratory distress  CV:  Palpation and auscultation of heart done , regular rate and rhythm, no murmur, rub, or gallop, Large legs at baseline. +1 LE edema   GI/ABDOMEN:  normal bowel sounds, soft, nontender, no hepatosplenomegaly or other masses  M/S:   moves extremities spontaneously. Ambulation not tested   SKIN:  no rashes to exposed skin.   NEURO:   intact   PSYCH:  oriented X 3, normal insight, judgement and memory, affect and mood normal,       Lab/Diagnostic data:  Recent labs in [facility] reviewed by me today.         Last Comprehensive Metabolic Panel:  Lab Results   Component Value Date     04/26/2024    POTASSIUM 4.6 04/26/2024    CHLORIDE 107 04/26/2024    CO2 24 04/26/2024    ANIONGAP 8 04/26/2024    GLC 96 04/26/2024    BUN 18.4 04/26/2024    CR 0.80 04/26/2024    GFRESTIMATED 80 04/26/2024    MAGGIE 12.6 (H) 04/26/2024       Lab Results   Component Value Date    WBC 7.0 05/01/2024     Lab Results   Component Value Date    RBC 4.21 05/01/2024     Lab Results   Component Value Date    HGB 11.8 05/01/2024 "     Lab Results   Component Value Date    HCT 35.3 05/01/2024     Lab Results   Component Value Date    MCV 84 05/01/2024     Lab Results   Component Value Date    MCH 28.0 05/01/2024     Lab Results   Component Value Date    MCHC 33.4 05/01/2024     Lab Results   Component Value Date    RDW 15.3 05/01/2024     Lab Results   Component Value Date     05/01/2024         ASSESSMENT/PLAN:  (I50.21) Acute systolic heart failure (H)  (primary encounter diagnosis)  Comment: She has been on Bumex 2 mg TID. Weight improved from 130 kg to 119 kg. Mild TOMAS noted and diuretics held. Creatinine 1.29 and BUN 50.   She will resume taking PTA Bumex 2 mg in the morning and 1 mg in the afternoon.    medication non-compliance might have contributed to her fluid overload and hospital admission.  - continue PTA Bumex 2 mg in the morning and 1 mg in the evening.   - continue fluid restriction 1500 ml/day  -Aldactone on hold due to TOMAS  Plan: PT/OT, restart [place], labs on 9/10, close monitor weights. Continue FR, follow up with Cards.     (N17.9,  N18.9) Acute kidney injury superimposed on CKD  (H24)  Comment: Creat 1.29.   Plan: Labs on 9/10    (J96.01) Acute respiratory failure with hypoxia (H)  Comment: Resolved   Plan: montior     (E11.9) Type 2 diabetes mellitus without complication, without long-term current use of insulin (H)  Comment: Jardiance. . Eating and drinking well.   Plan: Continue, change BS to every day     (E55.9) Vitamin D deficiency  Comment: PTA supplement  Plan: continue     (I89.0) Lymphedema in adult patient  Morbid Obesity   Comment: Elevated, wraps as able,  Plan: PT/OT        Electronically signed by:  Monika Mena CNP     Total time greater than 45 minutes reviewing chart in EPIC and PCC, medications, labs, vitals. Discussing with social work, physical therapy, occupational therapy and nursing.

## 2024-09-05 ENCOUNTER — TRANSITIONAL CARE UNIT VISIT (OUTPATIENT)
Dept: GERIATRICS | Facility: CLINIC | Age: 69
End: 2024-09-05
Payer: MEDICARE

## 2024-09-05 ENCOUNTER — LAB REQUISITION (OUTPATIENT)
Dept: LAB | Facility: CLINIC | Age: 69
End: 2024-09-05
Payer: MEDICARE

## 2024-09-05 DIAGNOSIS — N17.9 ACUTE KIDNEY INJURY SUPERIMPOSED ON CKD (H): ICD-10-CM

## 2024-09-05 DIAGNOSIS — E11.9 TYPE 2 DIABETES MELLITUS WITHOUT COMPLICATION, WITHOUT LONG-TERM CURRENT USE OF INSULIN (H): ICD-10-CM

## 2024-09-05 DIAGNOSIS — N18.9 ACUTE KIDNEY INJURY SUPERIMPOSED ON CKD (H): ICD-10-CM

## 2024-09-05 DIAGNOSIS — E55.9 VITAMIN D DEFICIENCY: ICD-10-CM

## 2024-09-05 DIAGNOSIS — I50.43 ACUTE ON CHRONIC COMBINED SYSTOLIC (CONGESTIVE) AND DIASTOLIC (CONGESTIVE) HEART FAILURE (H): ICD-10-CM

## 2024-09-05 DIAGNOSIS — J96.01 ACUTE RESPIRATORY FAILURE WITH HYPOXIA (H): ICD-10-CM

## 2024-09-05 DIAGNOSIS — I89.0 LYMPHEDEMA IN ADULT PATIENT: ICD-10-CM

## 2024-09-05 DIAGNOSIS — I50.21 ACUTE SYSTOLIC HEART FAILURE (H): Primary | ICD-10-CM

## 2024-09-05 PROCEDURE — 99310 SBSQ NF CARE HIGH MDM 45: CPT | Performed by: NURSE PRACTITIONER

## 2024-09-05 NOTE — LETTER
9/5/2024      Felisa Vasquez  [address]  [place] [address]        [facility] GERIATRICS    PRIMARY CARE PROVIDER AND CLINIC:  Provider Outside, No address on file  Chief Complaint   Patient presents with     Hospital F/U      [place] Medical Record Number:  [id number]  Place of Service where encounter took place:  [facility] ([facility]) [[id number]]    Felisa Vasquez  is a 69 year old  (1955), admitted to the above facility from  [facility] . Hospital stay 8/29/2024 through 9/3/2024..   Patient seen today for initial NP visit in the TCU:  1. Acute systolic heart failure (H)    2. Acute kidney injury superimposed on CKD  (H24)    3. Acute respiratory failure with hypoxia (H)    4. Type 2 diabetes mellitus without complication, without long-term current use of insulin (H)    5. Vitamin D deficiency    6. Lymphedema in adult patient          HPI:    Doing well. Offers no additional concerns. Hoping to go home next week. On Bumetanide, Bret and FR. Hospital weight 263 pounds on discharge. Today 270 pounds. Denies HA, chest pain, palpitations, dizziness. HR controlled. BP stable so far in TCU. No troubles breathing, no SOB, no Concerns with urination, no constipation. Eating and drinking okay. Sleeping okay. Denies pain but uses Robaxin PRN and tylenol PRN. Participating in therapies .  Lives at [facility] but would like to move elsewhere, ambulates with .      CODE STATUS/ADVANCE DIRECTIVES DISCUSSION:  Full Code  CPR/Full code   ALLERGIES:   Allergies   Allergen Reactions     Gabapentin Other (See Comments)     Sweating, breathing troubles, palpitation     Oxycodone Nausea     Sweaty, nauseated, light headed and faint     Lisinopril Cough     Paroxetine GI Disturbance      PAST MEDICAL HISTORY: History reviewed. No pertinent past medical history.   PAST SURGICAL HISTORY:   has no past surgical history on file.  FAMILY HISTORY: family history is not on file.  SOCIAL HISTORY:      Patient's living condition: lives alone    Post Discharge Medication Reconciliation Status:   MED REC REQUIRED  Post Medication Reconciliation Status: discharge medications reconciled and changed, per note/orders       Current Outpatient Medications   Medication Sig Dispense Refill     acetaminophen (TYLENOL) 500 MG tablet Take 500 mg by mouth every 6 hours as needed for mild pain.       albuterol (PROAIR HFA/PROVENTIL HFA/VENTOLIN HFA) 108 (90 Base) MCG/ACT inhaler Inhale 2 puffs into the lungs every 4 hours as needed for shortness of breath, wheezing or cough.       apixaban ANTICOAGULANT (ELIQUIS) 5 MG tablet Take 5 mg by mouth 2 times daily.       benzocaine-menthol (CHLORASEPTIC) 6-10 MG lozenge Place 1 lozenge inside cheek every 2 hours as needed for sore throat.       bumetanide (BUMEX) 1 MG tablet Take 1-2 mg by mouth daily.       cholecalciferol (VITAMIN D3) 25 mcg (1000 units) capsule Take 1 capsule by mouth daily.       diclofenac (VOLTAREN) 1 % topical gel Apply 2 g topically 4 times daily.       empagliflozin (JARDIANCE) 10 MG TABS tablet Take 10 mg by mouth daily.       guaiFENesin (ROBITUSSIN) 20 mg/mL liquid Take 10 mLs by mouth every 4 hours as needed for cough.       hydrOXYzine nay (VISTARIL) 25 MG capsule Take 25 mg by mouth every 6 hours as needed for itching.       ipratropium (ATROVENT HFA) 17 MCG/ACT inhaler Inhale 2 puffs into the lungs every 6 hours.       losartan (COZAAR) 25 MG tablet Take 12.5 mg by mouth daily.       melatonin 3 MG tablet Take 3 mg by mouth nightly as needed for sleep.       methocarbamol (ROBAXIN) 750 MG tablet Take 750 mg by mouth 4 times daily as needed for muscle spasms.       metoprolol succinate ER (TOPROL XL) 50 MG 24 hr tablet Take 50 mg by mouth 2 times daily.       mineral oil-hydrophilic petrolatum (AQUAPHOR) external ointment Apply topically as needed.       ondansetron (ZOFRAN) 4 MG tablet Take 4 mg by mouth every 8 hours as needed for nausea.        "spironolactone (ALDACTONE) 25 MG tablet Take 25 mg by mouth daily.       vitamin D2 (ERGOCALCIFEROL) 45548 units (1250 mcg) capsule Take 50,000 Units by mouth once a week.       No current facility-administered medications for this visit.       ROS:  10 point ROS of systems including Constitutional, Eyes, Respiratory, Cardiovascular, Gastroenterology, Genitourinary, Integumentary, Musculoskeletal, Psychiatric were all negative except for pertinent positives noted in my HPI.    Vitals:  /76   Pulse 71   Temp 98.7  F (37.1  C)   Resp 18   Ht 1.575 m (5' 2\")   Wt 122.5 kg (270 lb)   SpO2 100%   BMI 49.38 kg/m    Exam:  GENERAL APPEARANCE:  Alert, in no distress, oriented, cooperative. Sitting comfortably in chair.   ENT:  Mouth and posterior oropharynx normal, moist mucous membranes  EYES:  EOM, conjunctivae, lids, pupils and irises normal  NECK:  No adenopathy,masses or thyromegaly  RESP:  respiratory effort and palpation of chest normal, lungs clear to auscultation , no respiratory distress  CV:  Palpation and auscultation of heart done , regular rate and rhythm, no murmur, rub, or gallop, Large legs at baseline. +1 LE edema   GI/ABDOMEN:  normal bowel sounds, soft, nontender, no hepatosplenomegaly or other masses  M/S:   moves extremities spontaneously. Ambulation not tested   SKIN:  no rashes to exposed skin.   NEURO:   intact   PSYCH:  oriented X 3, normal insight, judgement and memory, affect and mood normal,       Lab/Diagnostic data:  Recent labs in [facility] reviewed by me today.         Last Comprehensive Metabolic Panel:  Lab Results   Component Value Date     04/26/2024    POTASSIUM 4.6 04/26/2024    CHLORIDE 107 04/26/2024    CO2 24 04/26/2024    ANIONGAP 8 04/26/2024    GLC 96 04/26/2024    BUN 18.4 04/26/2024    CR 0.80 04/26/2024    GFRESTIMATED 80 04/26/2024    MAGGIE 12.6 (H) 04/26/2024       Lab Results   Component Value Date    WBC 7.0 05/01/2024     Lab Results   Component Value Date    " RBC 4.21 05/01/2024     Lab Results   Component Value Date    HGB 11.8 05/01/2024     Lab Results   Component Value Date    HCT 35.3 05/01/2024     Lab Results   Component Value Date    MCV 84 05/01/2024     Lab Results   Component Value Date    MCH 28.0 05/01/2024     Lab Results   Component Value Date    MCHC 33.4 05/01/2024     Lab Results   Component Value Date    RDW 15.3 05/01/2024     Lab Results   Component Value Date     05/01/2024         ASSESSMENT/PLAN:  (I50.21) Acute systolic heart failure (H)  (primary encounter diagnosis)  Comment: She has been on Bumex 2 mg TID. Weight improved from 130 kg to 119 kg. Mild TOMAS noted and diuretics held. Creatinine 1.29 and BUN 50.   She will resume taking PTA Bumex 2 mg in the morning and 1 mg in the afternoon.    medication non-compliance might have contributed to her fluid overload and hospital admission.  - continue PTA Bumex 2 mg in the morning and 1 mg in the evening.   - continue fluid restriction 1500 ml/day  -Aldactone on hold due to TOMAS  Plan: PT/OT, restart Bret, labs on 9/10, close monitor weights. Continue FR, follow up with Cards.     (N17.9,  N18.9) Acute kidney injury superimposed on CKD  (H24)  Comment: Creat 1.29.   Plan: Labs on 9/10    (J96.01) Acute respiratory failure with hypoxia (H)  Comment: Resolved   Plan: montior     (E11.9) Type 2 diabetes mellitus without complication, without long-term current use of insulin (H)  Comment: Jardiance. . Eating and drinking well.   Plan: Continue, change BS to every day     (E55.9) Vitamin D deficiency  Comment: PTA supplement  Plan: continue     (I89.0) Lymphedema in adult patient  Morbid Obesity   Comment: Elevated, wraps as able,  Plan: PT/OT        Electronically signed by:  Monika Mena CNP     Total time greater than 45 minutes reviewing chart in EPIC and PCC, medications, labs, vitals. Discussing with social work, physical therapy, occupational therapy and nursing.                    Sincerely,        Monika Mena, CNP

## 2024-09-10 LAB
ANION GAP SERPL CALCULATED.3IONS-SCNC: 14 MMOL/L (ref 7–15)
BUN SERPL-MCNC: 36.6 MG/DL (ref 8–23)
CALCIUM SERPL-MCNC: 11.5 MG/DL (ref 8.8–10.4)
CHLORIDE SERPL-SCNC: 101 MMOL/L (ref 98–107)
CREAT SERPL-MCNC: 1.11 MG/DL (ref 0.51–0.95)
EGFRCR SERPLBLD CKD-EPI 2021: 54 ML/MIN/1.73M2
GLUCOSE SERPL-MCNC: 121 MG/DL (ref 70–99)
HCO3 SERPL-SCNC: 24 MMOL/L (ref 22–29)
POTASSIUM SERPL-SCNC: 3.9 MMOL/L (ref 3.4–5.3)
SODIUM SERPL-SCNC: 139 MMOL/L (ref 135–145)

## 2024-09-10 PROCEDURE — 36415 COLL VENOUS BLD VENIPUNCTURE: CPT | Performed by: NURSE PRACTITIONER

## 2024-09-10 PROCEDURE — 80048 BASIC METABOLIC PNL TOTAL CA: CPT | Performed by: NURSE PRACTITIONER

## 2024-09-10 PROCEDURE — P9604 ONE-WAY ALLOW PRORATED TRIP: HCPCS | Performed by: NURSE PRACTITIONER

## 2024-09-15 VITALS
TEMPERATURE: 98.2 F | HEIGHT: 62 IN | DIASTOLIC BLOOD PRESSURE: 64 MMHG | SYSTOLIC BLOOD PRESSURE: 123 MMHG | HEART RATE: 70 BPM | OXYGEN SATURATION: 99 % | BODY MASS INDEX: 49.87 KG/M2 | RESPIRATION RATE: 18 BRPM | WEIGHT: 271 LBS

## 2024-09-16 ENCOUNTER — TRANSITIONAL CARE UNIT VISIT (OUTPATIENT)
Dept: GERIATRICS | Facility: CLINIC | Age: 69
End: 2024-09-16
Payer: MEDICARE

## 2024-09-16 DIAGNOSIS — I89.0 LYMPHEDEMA IN ADULT PATIENT: ICD-10-CM

## 2024-09-16 DIAGNOSIS — E55.9 VITAMIN D DEFICIENCY: ICD-10-CM

## 2024-09-16 DIAGNOSIS — N17.9 ACUTE KIDNEY INJURY SUPERIMPOSED ON CKD (H): ICD-10-CM

## 2024-09-16 DIAGNOSIS — I50.21 ACUTE SYSTOLIC HEART FAILURE (H): Primary | ICD-10-CM

## 2024-09-16 DIAGNOSIS — E11.9 TYPE 2 DIABETES MELLITUS WITHOUT COMPLICATION, WITHOUT LONG-TERM CURRENT USE OF INSULIN (H): ICD-10-CM

## 2024-09-16 DIAGNOSIS — N18.9 ACUTE KIDNEY INJURY SUPERIMPOSED ON CKD (H): ICD-10-CM

## 2024-09-16 DIAGNOSIS — J96.01 ACUTE RESPIRATORY FAILURE WITH HYPOXIA (H): ICD-10-CM

## 2024-09-16 PROCEDURE — 99310 SBSQ NF CARE HIGH MDM 45: CPT | Performed by: NURSE PRACTITIONER

## 2024-09-16 NOTE — PROGRESS NOTES
[facility] GERIATRICS    PRIMARY CARE PROVIDER AND CLINIC:  Provider Outside, No address on file  Chief Complaint   Patient presents with    MODE      [place] Medical Record Number:  [id number]  Place of Service where encounter took place:  [facility] ([facility]) [[id number]]    Felisa Vasquez  is a 69 year old  (1955), admitted to the above facility from  [facility] . Hospital stay 8/29/2024 through 9/3/2024..   Patient seen today for subsequent NP visit in the TCU:  1. Acute systolic heart failure (H)    2. Acute kidney injury superimposed on CKD  (H24)    3. Type 2 diabetes mellitus without complication, without long-term current use of insulin (H)    4. Acute respiratory failure with hypoxia (H)    5. Lymphedema in adult patient    6. Vitamin D deficiency          HPI:    Doing well. Reports she is too dry, wants [place] stopped. On Bumetanide, [place] 25mg and FR. Hospital weight 263 pounds on discharge. Today 270>271 pounds. Discussion regarding weights if stopped. Denies HA, chest pain, palpitations, dizziness. No troubles breathing, no SOB, no Concerns with urination, no constipation. Eating and drinking okay. Sleeping okay. Denies pain but uses Robaxin PRN and tylenol PRN. Participating in therapies .  Lives at [facility] but would like to move elsewhere, ambulates with .      CODE STATUS/ADVANCE DIRECTIVES DISCUSSION:  Full Code  CPR/Full code   ALLERGIES:   Allergies   Allergen Reactions    Gabapentin Other (See Comments)     Sweating, breathing troubles, palpitation    Oxycodone Nausea     Sweaty, nauseated, light headed and faint    Lisinopril Cough    Paroxetine GI Disturbance      PAST MEDICAL HISTORY: History reviewed. No pertinent past medical history.   PAST SURGICAL HISTORY:   has no past surgical history on file.  FAMILY HISTORY: family history is not on file.  SOCIAL HISTORY:     Patient's living condition: lives alone    Post Discharge Medication Reconciliation  Status:   MED REC REQUIRED  Post Medication Reconciliation Status: discharge medications reconciled and changed, per note/orders       Current Outpatient Medications   Medication Sig Dispense Refill    acetaminophen (TYLENOL) 500 MG tablet Take 500 mg by mouth every 6 hours as needed for mild pain.      albuterol (PROAIR HFA/PROVENTIL HFA/VENTOLIN HFA) 108 (90 Base) MCG/ACT inhaler Inhale 2 puffs into the lungs every 4 hours as needed for shortness of breath, wheezing or cough.      apixaban ANTICOAGULANT (ELIQUIS) 5 MG tablet Take 5 mg by mouth 2 times daily.      benzocaine-menthol (CHLORASEPTIC) 6-10 MG lozenge Place 1 lozenge inside cheek every 2 hours as needed for sore throat.      bumetanide (BUMEX) 1 MG tablet Take 1-2 mg by mouth daily.      cholecalciferol (VITAMIN D3) 25 mcg (1000 units) capsule Take 1 capsule by mouth daily.      diclofenac (VOLTAREN) 1 % topical gel Apply 2 g topically 4 times daily.      empagliflozin (JARDIANCE) 10 MG TABS tablet Take 10 mg by mouth daily.      guaiFENesin (ROBITUSSIN) 20 mg/mL liquid Take 10 mLs by mouth every 4 hours as needed for cough.      hydrOXYzine nay (VISTARIL) 25 MG capsule Take 25 mg by mouth every 6 hours as needed for itching.      ipratropium (ATROVENT HFA) 17 MCG/ACT inhaler Inhale 2 puffs into the lungs every 6 hours.      losartan (COZAAR) 25 MG tablet Take 12.5 mg by mouth daily.      melatonin 3 MG tablet Take 3 mg by mouth nightly as needed for sleep.      methocarbamol (ROBAXIN) 750 MG tablet Take 750 mg by mouth 4 times daily as needed for muscle spasms.      metoprolol succinate ER (TOPROL XL) 50 MG 24 hr tablet Take 50 mg by mouth 2 times daily.      mineral oil-hydrophilic petrolatum (AQUAPHOR) external ointment Apply topically as needed.      ondansetron (ZOFRAN) 4 MG tablet Take 4 mg by mouth every 8 hours as needed for nausea.      spironolactone (ALDACTONE) 25 MG tablet Take 25 mg by mouth daily.      vitamin D2 (ERGOCALCIFEROL) 81710  "units (1250 mcg) capsule Take 50,000 Units by mouth once a week.       No current facility-administered medications for this visit.       ROS:  10 point ROS of systems including Constitutional, Eyes, Respiratory, Cardiovascular, Gastroenterology, Genitourinary, Integumentary, Musculoskeletal, Psychiatric were all negative except for pertinent positives noted in my HPI.    Vitals:  /64   Pulse 70   Temp 98.2  F (36.8  C)   Resp 18   Ht 1.575 m (5' 2\")   Wt 122.9 kg (271 lb)   SpO2 99%   BMI 49.57 kg/m        Exam:  GENERAL APPEARANCE:  Alert, in no distress, oriented, cooperative. Sitting comfortably in chair.   ENT:  Mouth and posterior oropharynx normal, moist mucous membranes  EYES:  EOM, conjunctivae, lids, pupils and irises normal  NECK:  No adenopathy,masses or thyromegaly  RESP:  respiratory effort and palpation of chest normal, lungs clear to auscultation , no respiratory distress  CV:  Palpation and auscultation of heart done , regular rate and rhythm, no murmur, rub, or gallop, Large legs at baseline. +1 LE edema   GI/ABDOMEN:  normal bowel sounds, soft, nontender, no hepatosplenomegaly or other masses  M/S:   moves extremities spontaneously. Ambulation not tested   SKIN:  no rashes to exposed skin.   NEURO:   intact   PSYCH:  oriented X 3, normal insight, judgement and memory, affect and mood normal,       Lab/Diagnostic data:  Recent labs in [facility] reviewed by me today.     Last Comprehensive Metabolic Panel:  Lab Results   Component Value Date     09/10/2024    POTASSIUM 3.9 09/10/2024    CHLORIDE 101 09/10/2024    CO2 24 09/10/2024    ANIONGAP 14 09/10/2024     (H) 09/10/2024    BUN 36.6 (H) 09/10/2024    CR 1.11 (H) 09/10/2024    GFRESTIMATED 54 (L) 09/10/2024    MAGGIE 11.5 (H) 09/10/2024               Last Comprehensive Metabolic Panel:  Lab Results   Component Value Date     09/10/2024    POTASSIUM 3.9 09/10/2024    CHLORIDE 101 09/10/2024    CO2 24 09/10/2024    ANIONGAP " 14 09/10/2024     (H) 09/10/2024    BUN 36.6 (H) 09/10/2024    CR 1.11 (H) 09/10/2024    GFRESTIMATED 54 (L) 09/10/2024    MAGGIE 11.5 (H) 09/10/2024       Lab Results   Component Value Date    WBC 7.0 05/01/2024     Lab Results   Component Value Date    RBC 4.21 05/01/2024     Lab Results   Component Value Date    HGB 11.8 05/01/2024     Lab Results   Component Value Date    HCT 35.3 05/01/2024     Lab Results   Component Value Date    MCV 84 05/01/2024     Lab Results   Component Value Date    MCH 28.0 05/01/2024     Lab Results   Component Value Date    MCHC 33.4 05/01/2024     Lab Results   Component Value Date    RDW 15.3 05/01/2024     Lab Results   Component Value Date     05/01/2024         ASSESSMENT/PLAN:  (I50.21) Acute systolic heart failure (H)  (primary encounter diagnosis)  Comment: She has been on Bumex 2 mg TID. Weight improved from 130 kg to 119 kg. Mild TOMAS noted and diuretics held. Creatinine 1.29 and BUN 50.   She will resume taking PTA Bumex 2 mg in the morning and 1 mg in the afternoon.    medication non-compliance might have contributed to her fluid overload and hospital admission.  - continue PTA Bumex 2 mg in the morning and 1 mg in the evening.   - continue fluid restriction 1500 ml/day  Restarted aldaltone but patient does not like taking it, thinks she is too dry. Weights are trending up: 263 on discharge from hospital now 271  Plan: PT/OT, hold Bret x 5 days, labs on 9/10 stable as above, close monitor weights. Continue FR, follow up with Cards.     (N17.9,  N18.9) Acute kidney injury superimposed on CKD  (H24)  Comment: Creat 1.29.   Plan: Labs on 9/10    (J96.01) Acute respiratory failure with hypoxia (H)  Comment: Resolved   Plan: montior     (E11.9) Type 2 diabetes mellitus without complication, without long-term current use of insulin (H)  Comment: Jardiance. . Eating and drinking well.   Plan: Continue, change BS to every day     (E55.9) Vitamin D  deficiency  Comment: PTA supplement  Plan: continue     (I89.0) Lymphedema in adult patient  Morbid Obesity   Comment: Elevated, wraps as able,  Plan: PT/OT        Electronically signed by:  Monika Mena CNP     Total time greater than 45 minutes reviewing chart in EPIC and PCC, medications, labs, vitals. Discussing with social work, physical therapy, occupational therapy and nursing.

## 2024-09-16 NOTE — LETTER
9/16/2024      Felisa Vasquez  [address]  [place] [address]        [facility] GERIATRICS    PRIMARY CARE PROVIDER AND CLINIC:  Provider Outside, No address on file  Chief Complaint   Patient presents with     JAQUELINEECK      [facility] Medical Record Number:  [id number]  Place of Service where encounter took place:  [facility] ([facility]) [[id number]]    Felisa Vasquez  is a 69 year old  (1955), admitted to the above facility from  [facility] . Hospital stay 8/29/2024 through 9/3/2024..   Patient seen today for subsequent NP visit in the TCU:  1. Acute systolic heart failure (H)    2. Acute kidney injury superimposed on CKD  (H24)    3. Type 2 diabetes mellitus without complication, without long-term current use of insulin (H)    4. Acute respiratory failure with hypoxia (H)    5. Lymphedema in adult patient    6. Vitamin D deficiency          HPI:    Doing well. Reports she is too dry, wants [place] stopped. On Bumetanide, Bret 25mg and FR. Hospital weight 263 pounds on discharge. Today 270>271 pounds. Discussion regarding weights if stopped. Denies HA, chest pain, palpitations, dizziness. No troubles breathing, no SOB, no Concerns with urination, no constipation. Eating and drinking okay. Sleeping okay. Denies pain but uses Robaxin PRN and tylenol PRN. Participating in therapies .  Lives at [facility] but would like to move elsewhere, ambulates with .      CODE STATUS/ADVANCE DIRECTIVES DISCUSSION:  Full Code  CPR/Full code   ALLERGIES:   Allergies   Allergen Reactions     Gabapentin Other (See Comments)     Sweating, breathing troubles, palpitation     Oxycodone Nausea     Sweaty, nauseated, light headed and faint     Lisinopril Cough     Paroxetine GI Disturbance      PAST MEDICAL HISTORY: History reviewed. No pertinent past medical history.   PAST SURGICAL HISTORY:   has no past surgical history on file.  FAMILY HISTORY: family history is not on file.  SOCIAL HISTORY:      Patient's living condition: lives alone    Post Discharge Medication Reconciliation Status:   MED REC REQUIRED  Post Medication Reconciliation Status: discharge medications reconciled and changed, per note/orders       Current Outpatient Medications   Medication Sig Dispense Refill     acetaminophen (TYLENOL) 500 MG tablet Take 500 mg by mouth every 6 hours as needed for mild pain.       albuterol (PROAIR HFA/PROVENTIL HFA/VENTOLIN HFA) 108 (90 Base) MCG/ACT inhaler Inhale 2 puffs into the lungs every 4 hours as needed for shortness of breath, wheezing or cough.       apixaban ANTICOAGULANT (ELIQUIS) 5 MG tablet Take 5 mg by mouth 2 times daily.       benzocaine-menthol (CHLORASEPTIC) 6-10 MG lozenge Place 1 lozenge inside cheek every 2 hours as needed for sore throat.       bumetanide (BUMEX) 1 MG tablet Take 1-2 mg by mouth daily.       cholecalciferol (VITAMIN D3) 25 mcg (1000 units) capsule Take 1 capsule by mouth daily.       diclofenac (VOLTAREN) 1 % topical gel Apply 2 g topically 4 times daily.       empagliflozin (JARDIANCE) 10 MG TABS tablet Take 10 mg by mouth daily.       guaiFENesin (ROBITUSSIN) 20 mg/mL liquid Take 10 mLs by mouth every 4 hours as needed for cough.       hydrOXYzine nay (VISTARIL) 25 MG capsule Take 25 mg by mouth every 6 hours as needed for itching.       ipratropium (ATROVENT HFA) 17 MCG/ACT inhaler Inhale 2 puffs into the lungs every 6 hours.       losartan (COZAAR) 25 MG tablet Take 12.5 mg by mouth daily.       melatonin 3 MG tablet Take 3 mg by mouth nightly as needed for sleep.       methocarbamol (ROBAXIN) 750 MG tablet Take 750 mg by mouth 4 times daily as needed for muscle spasms.       metoprolol succinate ER (TOPROL XL) 50 MG 24 hr tablet Take 50 mg by mouth 2 times daily.       mineral oil-hydrophilic petrolatum (AQUAPHOR) external ointment Apply topically as needed.       ondansetron (ZOFRAN) 4 MG tablet Take 4 mg by mouth every 8 hours as needed for nausea.        "spironolactone (ALDACTONE) 25 MG tablet Take 25 mg by mouth daily.       vitamin D2 (ERGOCALCIFEROL) 75861 units (1250 mcg) capsule Take 50,000 Units by mouth once a week.       No current facility-administered medications for this visit.       ROS:  10 point ROS of systems including Constitutional, Eyes, Respiratory, Cardiovascular, Gastroenterology, Genitourinary, Integumentary, Musculoskeletal, Psychiatric were all negative except for pertinent positives noted in my HPI.    Vitals:  /64   Pulse 70   Temp 98.2  F (36.8  C)   Resp 18   Ht 1.575 m (5' 2\")   Wt 122.9 kg (271 lb)   SpO2 99%   BMI 49.57 kg/m        Exam:  GENERAL APPEARANCE:  Alert, in no distress, oriented, cooperative. Sitting comfortably in chair.   ENT:  Mouth and posterior oropharynx normal, moist mucous membranes  EYES:  EOM, conjunctivae, lids, pupils and irises normal  NECK:  No adenopathy,masses or thyromegaly  RESP:  respiratory effort and palpation of chest normal, lungs clear to auscultation , no respiratory distress  CV:  Palpation and auscultation of heart done , regular rate and rhythm, no murmur, rub, or gallop, Large legs at baseline. +1 LE edema   GI/ABDOMEN:  normal bowel sounds, soft, nontender, no hepatosplenomegaly or other masses  M/S:   moves extremities spontaneously. Ambulation not tested   SKIN:  no rashes to exposed skin.   NEURO:   intact   PSYCH:  oriented X 3, normal insight, judgement and memory, affect and mood normal,       Lab/Diagnostic data:  Recent labs in [facility] reviewed by me today.     Last Comprehensive Metabolic Panel:  Lab Results   Component Value Date     09/10/2024    POTASSIUM 3.9 09/10/2024    CHLORIDE 101 09/10/2024    CO2 24 09/10/2024    ANIONGAP 14 09/10/2024     (H) 09/10/2024    BUN 36.6 (H) 09/10/2024    CR 1.11 (H) 09/10/2024    GFRESTIMATED 54 (L) 09/10/2024    MAGGIE 11.5 (H) 09/10/2024               Last Comprehensive Metabolic Panel:  Lab Results   Component Value Date    "  09/10/2024    POTASSIUM 3.9 09/10/2024    CHLORIDE 101 09/10/2024    CO2 24 09/10/2024    ANIONGAP 14 09/10/2024     (H) 09/10/2024    BUN 36.6 (H) 09/10/2024    CR 1.11 (H) 09/10/2024    GFRESTIMATED 54 (L) 09/10/2024    MAGGIE 11.5 (H) 09/10/2024       Lab Results   Component Value Date    WBC 7.0 05/01/2024     Lab Results   Component Value Date    RBC 4.21 05/01/2024     Lab Results   Component Value Date    HGB 11.8 05/01/2024     Lab Results   Component Value Date    HCT 35.3 05/01/2024     Lab Results   Component Value Date    MCV 84 05/01/2024     Lab Results   Component Value Date    MCH 28.0 05/01/2024     Lab Results   Component Value Date    MCHC 33.4 05/01/2024     Lab Results   Component Value Date    RDW 15.3 05/01/2024     Lab Results   Component Value Date     05/01/2024         ASSESSMENT/PLAN:  (I50.21) Acute systolic heart failure (H)  (primary encounter diagnosis)  Comment: She has been on Bumex 2 mg TID. Weight improved from 130 kg to 119 kg. Mild TOMAS noted and diuretics held. Creatinine 1.29 and BUN 50.   She will resume taking PTA Bumex 2 mg in the morning and 1 mg in the afternoon.    medication non-compliance might have contributed to her fluid overload and hospital admission.  - continue PTA Bumex 2 mg in the morning and 1 mg in the evening.   - continue fluid restriction 1500 ml/day  Restarted aldaltone but patient does not like taking it, thinks she is too dry. Weights are trending up: 263 on discharge from hospital now 271  Plan: PT/OT, hold [place] x 5 days, labs on 9/10 stable as above, close monitor weights. Continue FR, follow up with Cards.     (N17.9,  N18.9) Acute kidney injury superimposed on CKD  (H24)  Comment: Creat 1.29.   Plan: Labs on 9/10    (J96.01) Acute respiratory failure with hypoxia (H)  Comment: Resolved   Plan: montior     (E11.9) Type 2 diabetes mellitus without complication, without long-term current use of insulin (H)  Comment: Jardiance. BS  119. Eating and drinking well.   Plan: Continue, change BS to every day     (E55.9) Vitamin D deficiency  Comment: PTA supplement  Plan: continue     (I89.0) Lymphedema in adult patient  Morbid Obesity   Comment: Elevated, wraps as able,  Plan: PT/OT        Electronically signed by:  Monika Mena CNP     Total time greater than 45 minutes reviewing chart in EPIC and PCC, medications, labs, vitals. Discussing with social work, physical therapy, occupational therapy and nursing.                   Sincerely,        Monika Mena CNP

## 2024-09-17 ENCOUNTER — TELEPHONE (OUTPATIENT)
Dept: GERIATRICS | Facility: CLINIC | Age: 69
End: 2024-09-17
Payer: MEDICARE

## 2024-09-17 NOTE — TELEPHONE ENCOUNTER
[facility] Geriatrics Triage Nurse Telephone Encounter    Provider: KRISTA Beckford CNP  Facility: [facility] Facility Type:  TCU    Caller: Coreen  Call Back Number: [phone]    Allergies:    Allergies   Allergen Reactions    Gabapentin Other (See Comments)     Sweating, breathing troubles, palpitation    Oxycodone Nausea     Sweaty, nauseated, light headed and faint    Lisinopril Cough    Paroxetine GI Disturbance        Reason for call: Patient's weight today is 277. Yesterday's weight was 273.5. Legs are more edematous today. No SOB. When I was talking with the nurse, appears they didn't have patient on the 1500/day fluid restriction that was ordered. This may be a contributing factor to the weight gain. Order given yesterday to have the spironolactone on hold for 5 days.     Verbal Order/Direction given by Provider: Resume Spironolactone tomorrow.    Provider giving Order:  KRISTA Beckford CNP    Verbal Order given to: Coreen Escoto RN

## 2024-09-23 ENCOUNTER — TRANSITIONAL CARE UNIT VISIT (OUTPATIENT)
Dept: GERIATRICS | Facility: CLINIC | Age: 69
End: 2024-09-23
Payer: MEDICARE

## 2024-09-23 VITALS
TEMPERATURE: 98.2 F | HEIGHT: 62 IN | BODY MASS INDEX: 50.61 KG/M2 | HEART RATE: 72 BPM | SYSTOLIC BLOOD PRESSURE: 129 MMHG | DIASTOLIC BLOOD PRESSURE: 67 MMHG | RESPIRATION RATE: 16 BRPM | OXYGEN SATURATION: 100 % | WEIGHT: 275 LBS

## 2024-09-23 DIAGNOSIS — I89.0 LYMPHEDEMA IN ADULT PATIENT: ICD-10-CM

## 2024-09-23 DIAGNOSIS — N17.9 ACUTE KIDNEY INJURY SUPERIMPOSED ON CKD (H): ICD-10-CM

## 2024-09-23 DIAGNOSIS — E11.9 TYPE 2 DIABETES MELLITUS WITHOUT COMPLICATION, WITHOUT LONG-TERM CURRENT USE OF INSULIN (H): ICD-10-CM

## 2024-09-23 DIAGNOSIS — I50.21 ACUTE SYSTOLIC HEART FAILURE (H): Primary | ICD-10-CM

## 2024-09-23 DIAGNOSIS — E55.9 VITAMIN D DEFICIENCY: ICD-10-CM

## 2024-09-23 DIAGNOSIS — N18.9 ACUTE KIDNEY INJURY SUPERIMPOSED ON CKD (H): ICD-10-CM

## 2024-09-23 PROCEDURE — 99316 NF DSCHRG MGMT 30 MIN+: CPT | Performed by: NURSE PRACTITIONER

## 2024-09-23 NOTE — PROGRESS NOTES
[facility] GERIATRICS DISCHARGE SUMMARY  PATIENT'S NAME: Felisa Vasquez  YOB: 1955  MEDICAL RECORD NUMBER:  [id number]  Place of Service where encounter took place:  [facility] ([facility]) [[id number]]    PRIMARY CARE PROVIDER AND CLINIC RESPONSIBLE AFTER TRANSFER:   Provider Outside, No address on file    Nursing Home: [facility] TCU      Transferring providers: Monika Mena CNP, Hamida Roth MD  Recent Hospitalization/ED:  Hospital  [facility]  stay 8/29/2024 to 9/3/2024.  Date of SNF Admission:  9/3/2024  Date of SNF (anticipated) Discharge:  9/24/24  Discharged to: previous independent home  Cognitive Scores:  see therapy scores   Physical Function:  see therapy notes  DME: No DME needed    CODE STATUS/ADVANCE DIRECTIVES DISCUSSION:  Full Code   ALLERGIES: Gabapentin, Oxycodone, Lisinopril, and Paroxetine    NURSING FACILITY COURSE   Medication Changes/Rationale:   See below     Summary of nursing facility stay:   (I50.21) Acute systolic heart failure (H)  (primary encounter diagnosis)  Comment: She has been on Bumex 2 mg TID. Weight improved from 130 kg to 119 kg. Mild TOMAS noted and diuretics held. Creatinine 1.29 and BUN 50.   She will resume taking PTA Bumex 2 mg in the morning and 1 mg in the afternoon.    medication non-compliance might have contributed to her fluid overload and hospital admission.  - continue PTA Bumex 2 mg in the morning and 1 mg in the evening.   - continue fluid restriction 1500 ml/day  Restarted aldaltone but patient does not like taking it, thinks she is too dry. Weights are trending up: 263 on discharge from hospital now 271  Plan: PT/OT, restarted Bret due to weight gain labs on 9/10 stable as above, close monitor weights. Continue FR, follow up with Cards.      (N17.9,  N18.9) Acute kidney injury superimposed on CKD  (H24)  Comment: Creat 1.29.   Plan: Labs on 9/10 reviewed      (J96.01) Acute respiratory failure with hypoxia  (H)  Comment: Resolved   Plan: montior      (E11.9) Type 2 diabetes mellitus without complication, without long-term current use of insulin (H)  Comment: Jardiance. . Eating and drinking well.   Plan: Continue, change BS to every day      (E55.9) Vitamin D deficiency  Comment: PTA supplement  Plan: continue      (I89.0) Lymphedema in adult patient  Morbid Obesity   Comment: Elevated, wraps as able,  Plan: PT/OT    Discharge Medications:  MED REC REQUIRED  Post Medication Reconciliation Status: discharge medications reconciled, continue medications without change       Current Outpatient Medications   Medication Sig Dispense Refill    acetaminophen (TYLENOL) 500 MG tablet Take 500 mg by mouth every 6 hours as needed for mild pain.      albuterol (PROAIR HFA/PROVENTIL HFA/VENTOLIN HFA) 108 (90 Base) MCG/ACT inhaler Inhale 2 puffs into the lungs every 4 hours as needed for shortness of breath, wheezing or cough.      apixaban ANTICOAGULANT (ELIQUIS) 5 MG tablet Take 5 mg by mouth 2 times daily.      benzocaine-menthol (CHLORASEPTIC) 6-10 MG lozenge Place 1 lozenge inside cheek every 2 hours as needed for sore throat.      bumetanide (BUMEX) 1 MG tablet Take 1-2 mg by mouth daily.      cholecalciferol (VITAMIN D3) 25 mcg (1000 units) capsule Take 1 capsule by mouth daily.      diclofenac (VOLTAREN) 1 % topical gel Apply 2 g topically 4 times daily.      empagliflozin (JARDIANCE) 10 MG TABS tablet Take 10 mg by mouth daily.      guaiFENesin (ROBITUSSIN) 20 mg/mL liquid Take 10 mLs by mouth every 4 hours as needed for cough.      hydrOXYzine nay (VISTARIL) 25 MG capsule Take 25 mg by mouth every 6 hours as needed for itching.      ipratropium (ATROVENT HFA) 17 MCG/ACT inhaler Inhale 2 puffs into the lungs every 6 hours.      losartan (COZAAR) 25 MG tablet Take 12.5 mg by mouth daily.      melatonin 3 MG tablet Take 3 mg by mouth nightly as needed for sleep.      methocarbamol (ROBAXIN) 750 MG tablet Take 750 mg by  "mouth 4 times daily as needed for muscle spasms.      metoprolol succinate ER (TOPROL XL) 50 MG 24 hr tablet Take 50 mg by mouth 2 times daily.      mineral oil-hydrophilic petrolatum (AQUAPHOR) external ointment Apply topically as needed.      ondansetron (ZOFRAN) 4 MG tablet Take 4 mg by mouth every 8 hours as needed for nausea.      spironolactone (ALDACTONE) 25 MG tablet Take 25 mg by mouth daily.      vitamin D2 (ERGOCALCIFEROL) 80091 units (1250 mcg) capsule Take 50,000 Units by mouth once a week.            Controlled medications:   not applicable/none     Past Medical History: History reviewed. No pertinent past medical history.  Physical Exam:   Vitals: /67   Pulse 72   Temp 98.2  F (36.8  C)   Resp 16   Ht 1.575 m (5' 2\")   Wt 124.7 kg (275 lb)   SpO2 100%   BMI 50.30 kg/m    BMI: Body mass index is 50.3 kg/m .  GENERAL APPEARANCE:  Alert, in no distress, oriented, cooperative  ENT:  Mouth and posterior oropharynx normal, moist mucous membranes  EYES:  EOM, conjunctivae, lids, pupils and irises normal  NECK:  No adenopathy,masses or thyromegaly  RESP:  respiratory effort and palpation of chest normal, lungs clear to auscultation , no respiratory distress  CV:  Palpation and auscultation of heart done , regular rate and rhythm, no murmur, rub, or gallop, LE edema bilaterally  GI/ABDOMEN:  normal bowel sounds, soft, nontender, no hepatosplenomegaly or other masses  M/S:   moves extremities spontaneously.   SKIN:  no rashes to exposed skin.   NEURO:   intact   PSYCH:  oriented X 3, normal insight, judgement and memory, affect and mood normal,        SNF labs: Recent labs in [facility] reviewed by me today.     Last Comprehensive Metabolic Panel:  Lab Results   Component Value Date     09/10/2024    POTASSIUM 3.9 09/10/2024    CHLORIDE 101 09/10/2024    CO2 24 09/10/2024    ANIONGAP 14 09/10/2024     (H) 09/10/2024    BUN 36.6 (H) 09/10/2024    CR 1.11 (H) 09/10/2024    GFRESTIMATED 54 (L) " 09/10/2024    MAGGIE 11.5 (H) 09/10/2024       Lab Results   Component Value Date    WBC 7.0 05/01/2024     Lab Results   Component Value Date    RBC 4.21 05/01/2024     Lab Results   Component Value Date    HGB 11.8 05/01/2024     Lab Results   Component Value Date    HCT 35.3 05/01/2024     Lab Results   Component Value Date    MCV 84 05/01/2024     Lab Results   Component Value Date    MCH 28.0 05/01/2024     Lab Results   Component Value Date    MCHC 33.4 05/01/2024     Lab Results   Component Value Date    RDW 15.3 05/01/2024     Lab Results   Component Value Date     05/01/2024         DISCHARGE PLAN:  Follow up labs: As directed by PCP  Medical Follow Up:      Follow up with primary care provider in 5-7 days  Follow up with specialist Cardiology    [facility] scheduled appointments:  Appointments in Next Year      Sep 23, 2024 9:00 AM  Transitional Care Unit Visit with Monika Mena CNP  [facility] Geriatrics ([facility] ) [phone]           Discharge Services: Home Care:  Occupational Therapy, Physical Therapy, Registered Nurse, and Home Health Aide  Discharge Instructions Verbalized to Patient at Discharge:   None    TOTAL DISCHARGE TIME:   Greater than 30 minutes  Electronically signed by:  Monika Mena CNP     Documentation of Face to Face and Certification for Home Health Services    I certify that services are/were furnished while this patient was under the care of a physician and that a physician or an allowed non-physician practitioner (NPP), had a face-to-face encounter that meets the physician face-to-face encounter requirements. The encounter was in whole, or in part, related to the primary reason for home health. The patient is confined to his/her home and needs intermittent skilled nursing, physical therapy, speech-language pathology, or the continued need for occupational therapy. A plan of care has been established by a physician and is  periodically reviewed by a physician.  Date of Face-to-Face Encounter: 9/23/2024.    I certify that, based on my findings, the following services are medically necessary home health services: Nursing, Occupational Therapy, Physical Therapy, and HHA .    My clinical findings support the need for the above skilled services because: Requires assistance of another person or specialized equipment to access medical services because patient: Requires supervision of another for safe transfer...    Patient to re-establish plan of care with their PCP within 7-10 days after leaving the facility to reestablish care.  Medicare certified PECOS provider: Monika Mena CNP  Date: September 23, 2024

## 2024-09-23 NOTE — LETTER
9/23/2024      Felisa Vasquez  [address]  [place] [address]        [facility] GERIATRICS DISCHARGE SUMMARY  PATIENT'S NAME: Felisa Vasquez  YOB: 1955  MEDICAL RECORD NUMBER:  [id number]  Place of Service where encounter took place:  [facility] ([facility]) [[id number]]    PRIMARY CARE PROVIDER AND CLINIC RESPONSIBLE AFTER TRANSFER:   Provider Outside, No address on file    Nursing Home: [facility]      Transferring providers: Monika Mena CNP, Hamida Roth MD  Recent Hospitalization/ED:  Hospital  [facility]  stay 8/29/2024 to 9/3/2024.  Date of SNF Admission:  9/3/2024  Date of SNF (anticipated) Discharge:  9/24/24  Discharged to: previous independent home  Cognitive Scores:  see therapy scores   Physical Function:  see therapy notes  DME: No DME needed    CODE STATUS/ADVANCE DIRECTIVES DISCUSSION:  Full Code   ALLERGIES: Gabapentin, Oxycodone, Lisinopril, and Paroxetine    NURSING FACILITY COURSE   Medication Changes/Rationale:   See below     Summary of nursing facility stay:   (I50.21) Acute systolic heart failure (H)  (primary encounter diagnosis)  Comment: She has been on Bumex 2 mg TID. Weight improved from 130 kg to 119 kg. Mild TOMAS noted and diuretics held. Creatinine 1.29 and BUN 50.   She will resume taking PTA Bumex 2 mg in the morning and 1 mg in the afternoon.    medication non-compliance might have contributed to her fluid overload and hospital admission.  - continue PTA Bumex 2 mg in the morning and 1 mg in the evening.   - continue fluid restriction 1500 ml/day  Restarted aldaltone but patient does not like taking it, thinks she is too dry. Weights are trending up: 263 on discharge from hospital now 271  Plan: PT/OT, restarted Bret due to weight gain labs on 9/10 stable as above, close monitor weights. Continue FR, follow up with Cards.      (N17.9,  N18.9) Acute kidney injury superimposed on CKD  (H24)  Comment: Creat 1.29.    Plan: Labs on 9/10 reviewed      (J96.01) Acute respiratory failure with hypoxia (H)  Comment: Resolved   Plan: montior      (E11.9) Type 2 diabetes mellitus without complication, without long-term current use of insulin (H)  Comment: Jardiance. . Eating and drinking well.   Plan: Continue, change BS to every day      (E55.9) Vitamin D deficiency  Comment: PTA supplement  Plan: continue      (I89.0) Lymphedema in adult patient  Morbid Obesity   Comment: Elevated, wraps as able,  Plan: PT/OT    Discharge Medications:  MED REC REQUIRED  Post Medication Reconciliation Status: discharge medications reconciled, continue medications without change       Current Outpatient Medications   Medication Sig Dispense Refill     acetaminophen (TYLENOL) 500 MG tablet Take 500 mg by mouth every 6 hours as needed for mild pain.       albuterol (PROAIR HFA/PROVENTIL HFA/VENTOLIN HFA) 108 (90 Base) MCG/ACT inhaler Inhale 2 puffs into the lungs every 4 hours as needed for shortness of breath, wheezing or cough.       apixaban ANTICOAGULANT (ELIQUIS) 5 MG tablet Take 5 mg by mouth 2 times daily.       benzocaine-menthol (CHLORASEPTIC) 6-10 MG lozenge Place 1 lozenge inside cheek every 2 hours as needed for sore throat.       bumetanide (BUMEX) 1 MG tablet Take 1-2 mg by mouth daily.       cholecalciferol (VITAMIN D3) 25 mcg (1000 units) capsule Take 1 capsule by mouth daily.       diclofenac (VOLTAREN) 1 % topical gel Apply 2 g topically 4 times daily.       empagliflozin (JARDIANCE) 10 MG TABS tablet Take 10 mg by mouth daily.       guaiFENesin (ROBITUSSIN) 20 mg/mL liquid Take 10 mLs by mouth every 4 hours as needed for cough.       hydrOXYzine nay (VISTARIL) 25 MG capsule Take 25 mg by mouth every 6 hours as needed for itching.       ipratropium (ATROVENT HFA) 17 MCG/ACT inhaler Inhale 2 puffs into the lungs every 6 hours.       losartan (COZAAR) 25 MG tablet Take 12.5 mg by mouth daily.       melatonin 3 MG tablet Take 3 mg  "by mouth nightly as needed for sleep.       methocarbamol (ROBAXIN) 750 MG tablet Take 750 mg by mouth 4 times daily as needed for muscle spasms.       metoprolol succinate ER (TOPROL XL) 50 MG 24 hr tablet Take 50 mg by mouth 2 times daily.       mineral oil-hydrophilic petrolatum (AQUAPHOR) external ointment Apply topically as needed.       ondansetron (ZOFRAN) 4 MG tablet Take 4 mg by mouth every 8 hours as needed for nausea.       spironolactone (ALDACTONE) 25 MG tablet Take 25 mg by mouth daily.       vitamin D2 (ERGOCALCIFEROL) 25610 units (1250 mcg) capsule Take 50,000 Units by mouth once a week.            Controlled medications:   not applicable/none     Past Medical History: History reviewed. No pertinent past medical history.  Physical Exam:   Vitals: /67   Pulse 72   Temp 98.2  F (36.8  C)   Resp 16   Ht 1.575 m (5' 2\")   Wt 124.7 kg (275 lb)   SpO2 100%   BMI 50.30 kg/m    BMI: Body mass index is 50.3 kg/m .  GENERAL APPEARANCE:  Alert, in no distress, oriented, cooperative  ENT:  Mouth and posterior oropharynx normal, moist mucous membranes  EYES:  EOM, conjunctivae, lids, pupils and irises normal  NECK:  No adenopathy,masses or thyromegaly  RESP:  respiratory effort and palpation of chest normal, lungs clear to auscultation , no respiratory distress  CV:  Palpation and auscultation of heart done , regular rate and rhythm, no murmur, rub, or gallop, LE edema bilaterally  GI/ABDOMEN:  normal bowel sounds, soft, nontender, no hepatosplenomegaly or other masses  M/S:   moves extremities spontaneously.   SKIN:  no rashes to exposed skin.   NEURO:   intact   PSYCH:  oriented X 3, normal insight, judgement and memory, affect and mood normal,        SNF labs: Recent labs in [facility] reviewed by me today.     Last Comprehensive Metabolic Panel:  Lab Results   Component Value Date     09/10/2024    POTASSIUM 3.9 09/10/2024    CHLORIDE 101 09/10/2024    CO2 24 09/10/2024    ANIONGAP 14 09/10/2024 "     (H) 09/10/2024    BUN 36.6 (H) 09/10/2024    CR 1.11 (H) 09/10/2024    GFRESTIMATED 54 (L) 09/10/2024    MAGGIE 11.5 (H) 09/10/2024       Lab Results   Component Value Date    WBC 7.0 05/01/2024     Lab Results   Component Value Date    RBC 4.21 05/01/2024     Lab Results   Component Value Date    HGB 11.8 05/01/2024     Lab Results   Component Value Date    HCT 35.3 05/01/2024     Lab Results   Component Value Date    MCV 84 05/01/2024     Lab Results   Component Value Date    MCH 28.0 05/01/2024     Lab Results   Component Value Date    MCHC 33.4 05/01/2024     Lab Results   Component Value Date    RDW 15.3 05/01/2024     Lab Results   Component Value Date     05/01/2024         DISCHARGE PLAN:  Follow up labs: As directed by PCP  Medical Follow Up:      Follow up with primary care provider in 5-7 days  Follow up with specialist Cardiology    [facility] scheduled appointments:  Appointments in Next Year      Sep 23, 2024 9:00 AM  Transitional Care Unit Visit with Monika Mena CNP  [facility] ([facility] ) [phone]           Discharge Services: Home Care:  Occupational Therapy, Physical Therapy, Registered Nurse, and Home Health Aide  Discharge Instructions Verbalized to Patient at Discharge:   None    TOTAL DISCHARGE TIME:   Greater than 30 minutes  Electronically signed by:  Monika Mena CNP     Documentation of Face to Face and Certification for Home Health Services    I certify that services are/were furnished while this patient was under the care of a physician and that a physician or an allowed non-physician practitioner (NPP), had a face-to-face encounter that meets the physician face-to-face encounter requirements. The encounter was in whole, or in part, related to the primary reason for home health. The patient is confined to his/her home and needs intermittent skilled nursing, physical therapy, speech-language pathology, or the  continued need for occupational therapy. A plan of care has been established by a physician and is periodically reviewed by a physician.  Date of Face-to-Face Encounter: 9/23/2024.    I certify that, based on my findings, the following services are medically necessary home health services: Nursing, Occupational Therapy, Physical Therapy, and HHA .    My clinical findings support the need for the above skilled services because: Requires assistance of another person or specialized equipment to access medical services because patient: Requires supervision of another for safe transfer...    Patient to re-establish plan of care with their PCP within 7-10 days after leaving the facility to reestablish care.  Medicare certified PECOS provider: Monika Mena CNP  Date: September 23, 2024              Sincerely,        Monika Mena CNP

## 2025-05-20 ENCOUNTER — LAB REQUISITION (OUTPATIENT)
Dept: LAB | Facility: CLINIC | Age: 70
End: 2025-05-20
Payer: MEDICARE

## 2025-05-20 ENCOUNTER — DOCUMENTATION ONLY (OUTPATIENT)
Dept: GERIATRICS | Facility: CLINIC | Age: 70
End: 2025-05-20
Payer: MEDICARE

## 2025-05-20 DIAGNOSIS — I50.43 ACUTE ON CHRONIC COMBINED SYSTOLIC (CONGESTIVE) AND DIASTOLIC (CONGESTIVE) HEART FAILURE (H): ICD-10-CM

## 2025-05-21 ENCOUNTER — RESULTS FOLLOW-UP (OUTPATIENT)
Dept: GERIATRICS | Facility: CLINIC | Age: 70
End: 2025-05-21

## 2025-05-21 LAB
ANION GAP SERPL CALCULATED.3IONS-SCNC: 12 MMOL/L (ref 7–15)
BUN SERPL-MCNC: 29.4 MG/DL (ref 8–23)
CALCIUM SERPL-MCNC: 10.8 MG/DL (ref 8.8–10.4)
CHLORIDE SERPL-SCNC: 102 MMOL/L (ref 98–107)
CREAT SERPL-MCNC: 1.08 MG/DL (ref 0.51–0.95)
EGFRCR SERPLBLD CKD-EPI 2021: 55 ML/MIN/1.73M2
GLUCOSE SERPL-MCNC: 133 MG/DL (ref 70–99)
HCO3 SERPL-SCNC: 26 MMOL/L (ref 22–29)
POTASSIUM SERPL-SCNC: 4.9 MMOL/L (ref 3.4–5.3)
SODIUM SERPL-SCNC: 140 MMOL/L (ref 135–145)

## 2025-05-21 PROCEDURE — P9603 ONE-WAY ALLOW PRORATED MILES: HCPCS | Mod: ORL | Performed by: FAMILY MEDICINE

## 2025-05-21 PROCEDURE — 80048 BASIC METABOLIC PNL TOTAL CA: CPT | Mod: ORL | Performed by: FAMILY MEDICINE

## 2025-05-21 PROCEDURE — 36415 COLL VENOUS BLD VENIPUNCTURE: CPT | Mod: ORL | Performed by: FAMILY MEDICINE

## 2025-05-22 VITALS
DIASTOLIC BLOOD PRESSURE: 62 MMHG | HEART RATE: 74 BPM | SYSTOLIC BLOOD PRESSURE: 109 MMHG | WEIGHT: 289 LBS | OXYGEN SATURATION: 98 % | HEIGHT: 62 IN | BODY MASS INDEX: 53.18 KG/M2 | RESPIRATION RATE: 18 BRPM | TEMPERATURE: 98.3 F

## 2025-05-22 PROBLEM — R19.7 DIARRHEA: Status: ACTIVE | Noted: 2025-02-20

## 2025-05-22 PROBLEM — I48.11 LONGSTANDING PERSISTENT ATRIAL FIBRILLATION (H): Status: ACTIVE | Noted: 2025-05-15

## 2025-05-22 PROBLEM — E87.6 ACUTE HYPOKALEMIA: Status: ACTIVE | Noted: 2019-06-25

## 2025-05-22 PROBLEM — N18.30 BENIGN HYPERTENSION WITH CHRONIC KIDNEY DISEASE, STAGE III (H): Status: ACTIVE | Noted: 2025-02-20

## 2025-05-22 PROBLEM — M25.571 CHRONIC PAIN OF RIGHT ANKLE: Status: ACTIVE | Noted: 2025-02-11

## 2025-05-22 PROBLEM — Z79.01 CURRENT USE OF LONG TERM ANTICOAGULATION: Status: ACTIVE | Noted: 2025-02-11

## 2025-05-22 PROBLEM — G89.29 CHRONIC PAIN OF RIGHT ANKLE: Status: ACTIVE | Noted: 2025-02-11

## 2025-05-22 PROBLEM — I12.9 BENIGN HYPERTENSION WITH CHRONIC KIDNEY DISEASE, STAGE III (H): Status: ACTIVE | Noted: 2025-02-20

## 2025-05-22 RX ORDER — POTASSIUM BICARBONATE 782 MG/1
20 TABLET, EFFERVESCENT ORAL DAILY
COMMUNITY

## 2025-05-22 RX ORDER — DIPHENHYDRAMINE HCL 25 MG
25 CAPSULE ORAL EVERY 6 HOURS PRN
COMMUNITY

## 2025-05-22 RX ORDER — LOPERAMIDE HYDROCHLORIDE 2 MG/1
2 CAPSULE ORAL 4 TIMES DAILY PRN
COMMUNITY

## 2025-05-22 RX ORDER — AMMONIUM LACTATE 12 G/100G
LOTION TOPICAL 2 TIMES DAILY
COMMUNITY

## 2025-05-22 NOTE — PROGRESS NOTES
[facility] GERIATRICS    PRIMARY CARE PROVIDER AND CLINIC:  Provider Outside, No address on file***  Chief Complaint   Patient presents with    Hospital F/U      [place] Medical Record Number:  [id number]  Place of Service where encounter took place:  [facility] ([facility]) [[id number]]    Felisa Vasquez  is a 70 year old  (1955), admitted to the above facility from  [facility] . Hospital stay 5/12/2025 through 5/19/2025..   HPI:    ***    CODE STATUS/ADVANCE DIRECTIVES DISCUSSION:  Full Code  CPR/Full code   ALLERGIES:   Allergies   Allergen Reactions    Gabapentin Other (See Comments)     Sweating, breathing troubles, palpitation    Oxycodone Nausea     Sweaty, nauseated, light headed and faint    Lisinopril Cough    Paroxetine GI Disturbance      PAST MEDICAL HISTORY: History reviewed. No pertinent past medical history.   PAST SURGICAL HISTORY:   has no past surgical history on file.  FAMILY HISTORY: family history is not on file.  SOCIAL HISTORY:     Patient's living condition: {LIVES WITH (NURSING HOME):456585}    Post Discharge Medication Reconciliation Status:   MED REC REQUIRED{TIP  Click the link below to document or use med rec list, use list to pull in response :[id number]}  Post Medication Reconciliation Status: {MED REC LIST:644031}       Current Outpatient Medications   Medication Sig Dispense Refill    ammonium lactate (LAC-HYDRIN) 12 % external lotion Apply topically 2 times daily.      diphenhydrAMINE (BENADRYL) 25 MG capsule Take 25 mg by mouth every 6 hours as needed for itching or allergies.      loperamide (IMODIUM) 2 MG capsule Take 2 mg by mouth 4 times daily as needed for diarrhea.      Potassium Bicarb-Citric Acid (EFFER-K) 20 MEQ TBEF Take 20 mEq by mouth daily.      acetaminophen (TYLENOL) 500 MG tablet Take 500 mg by mouth every 6 hours as needed for mild pain.      albuterol (PROAIR HFA/PROVENTIL HFA/VENTOLIN HFA) 108 (90 Base) MCG/ACT inhaler Inhale 2 puffs into  the lungs every 4 hours as needed for shortness of breath, wheezing or cough.      apixaban ANTICOAGULANT (ELIQUIS) 5 MG tablet Take 5 mg by mouth 2 times daily.      benzocaine-menthol (CHLORASEPTIC) 6-10 MG lozenge Place 1 lozenge inside cheek every 2 hours as needed for sore throat. (Patient not taking: Reported on 5/22/2025)      bumetanide (BUMEX) 1 MG tablet Take 1-2 mg by mouth daily.      cholecalciferol (VITAMIN D3) 25 mcg (1000 units) capsule Take 1 capsule by mouth daily. (Patient not taking: Reported on 5/22/2025)      diclofenac (VOLTAREN) 1 % topical gel Apply 2 g topically 4 times daily.      empagliflozin (JARDIANCE) 10 MG TABS tablet Take 10 mg by mouth daily. (Patient not taking: Reported on 5/22/2025)      guaiFENesin (ROBITUSSIN) 20 mg/mL liquid Take 10 mLs by mouth every 4 hours as needed for cough.      hydrOXYzine nay (VISTARIL) 25 MG capsule Take 25 mg by mouth every 6 hours as needed for itching.      ipratropium (ATROVENT HFA) 17 MCG/ACT inhaler Inhale 2 puffs into the lungs every 6 hours.      losartan (COZAAR) 25 MG tablet Take 12.5 mg by mouth daily.      melatonin 3 MG tablet Take 3 mg by mouth nightly as needed for sleep. (Patient not taking: Reported on 5/22/2025)      methocarbamol (ROBAXIN) 750 MG tablet Take 750 mg by mouth 4 times daily as needed for muscle spasms.      metoprolol succinate ER (TOPROL XL) 50 MG 24 hr tablet Take 50 mg by mouth 2 times daily.      mineral oil-hydrophilic petrolatum (AQUAPHOR) external ointment Apply topically as needed. (Patient not taking: Reported on 5/22/2025)      ondansetron (ZOFRAN) 4 MG tablet Take 4 mg by mouth every 8 hours as needed for nausea. (Patient not taking: Reported on 5/22/2025)      spironolactone (ALDACTONE) 25 MG tablet Take 25 mg by mouth daily.      vitamin D2 (ERGOCALCIFEROL) 74494 units (1250 mcg) capsule Take 50,000 Units by mouth once a week. (Patient not taking: Reported on 5/22/2025)       No current  "facility-administered medications for this visit.       ROS:  {ROS FGS:771152}    Vitals:  /62   Pulse 74   Temp 98.3  F (36.8  C)   Resp 18   Ht 1.575 m (5' 2\")   Wt 131.1 kg (289 lb)   SpO2 98%   BMI 52.86 kg/m    Exam:  {Nursing home physical exam :377106}    Lab/Diagnostic data:  {fgslab:658588}    ASSESSMENT/PLAN:    {FGS DX2:682695}    Orders:  {fgsorders:488819}  ***    Electronically signed by:  Anila Mendez CMA ***              "

## 2025-05-23 ENCOUNTER — TRANSITIONAL CARE UNIT VISIT (OUTPATIENT)
Dept: GERIATRICS | Facility: CLINIC | Age: 70
End: 2025-05-23
Payer: MEDICARE

## 2025-05-23 NOTE — LETTER
5/23/2025      [facility]  [address]  [place] [address]        No notes on file      Sincerely,        Monika Mena, CNP    Electronically signed

## 2025-05-27 ENCOUNTER — LAB REQUISITION (OUTPATIENT)
Dept: LAB | Facility: CLINIC | Age: 70
End: 2025-05-27
Payer: MEDICARE

## 2025-05-27 ENCOUNTER — TRANSITIONAL CARE UNIT VISIT (OUTPATIENT)
Dept: GERIATRICS | Facility: CLINIC | Age: 70
End: 2025-05-27
Payer: MEDICARE

## 2025-05-27 VITALS
TEMPERATURE: 98.3 F | HEART RATE: 90 BPM | RESPIRATION RATE: 19 BRPM | HEIGHT: 62 IN | BODY MASS INDEX: 52.08 KG/M2 | DIASTOLIC BLOOD PRESSURE: 84 MMHG | WEIGHT: 283 LBS | SYSTOLIC BLOOD PRESSURE: 131 MMHG | OXYGEN SATURATION: 99 %

## 2025-05-27 DIAGNOSIS — I05.9 RHEUMATIC MITRAL VALVE DISEASE: ICD-10-CM

## 2025-05-27 DIAGNOSIS — I34.0 SEVERE MITRAL REGURGITATION: ICD-10-CM

## 2025-05-27 DIAGNOSIS — I50.32 CHRONIC DIASTOLIC HEART FAILURE (H): ICD-10-CM

## 2025-05-27 DIAGNOSIS — N18.30 BENIGN HYPERTENSION WITH CHRONIC KIDNEY DISEASE, STAGE III (H): ICD-10-CM

## 2025-05-27 DIAGNOSIS — I50.43 ACUTE ON CHRONIC COMBINED SYSTOLIC (CONGESTIVE) AND DIASTOLIC (CONGESTIVE) HEART FAILURE (H): ICD-10-CM

## 2025-05-27 DIAGNOSIS — R06.09 DYSPNEA ON EXERTION: Primary | ICD-10-CM

## 2025-05-27 DIAGNOSIS — E11.9 TYPE 2 DIABETES MELLITUS WITHOUT COMPLICATION, WITHOUT LONG-TERM CURRENT USE OF INSULIN (H): ICD-10-CM

## 2025-05-27 DIAGNOSIS — I48.21 PERMANENT ATRIAL FIBRILLATION (H): ICD-10-CM

## 2025-05-27 DIAGNOSIS — Z74.09 LIMITED MOBILITY: ICD-10-CM

## 2025-05-27 DIAGNOSIS — I10 ESSENTIAL (PRIMARY) HYPERTENSION: ICD-10-CM

## 2025-05-27 DIAGNOSIS — I12.9 BENIGN HYPERTENSION WITH CHRONIC KIDNEY DISEASE, STAGE III (H): ICD-10-CM

## 2025-05-27 DIAGNOSIS — M62.81 GENERALIZED MUSCLE WEAKNESS: ICD-10-CM

## 2025-05-27 PROBLEM — E08.9 DIABETES MELLITUS DUE TO UNDERLYING CONDITION (H): Status: RESOLVED | Noted: 2020-12-16 | Resolved: 2025-05-27

## 2025-05-27 PROBLEM — I50.21 ACUTE SYSTOLIC HEART FAILURE (H): Status: RESOLVED | Noted: 2022-04-22 | Resolved: 2025-05-27

## 2025-05-27 NOTE — LETTER
" 5/27/2025      Felisa Vasquez  [address]  [place] [address]        [facility] GERIATRICS    Chief Complaint   Patient presents with     Follow Up     HPI:  Felisa Vasquez is a 70 year old  (1955), who is being seen today for an episodic care visit at: [facility] ([facility]) [[id number]].  She has past medical history significant for chronic systolic heart failure hypertension, hyperlipidemia, rheumatic mitral valve, moderate atrial regurgitation.  She was recently hospitalized for acute on chronic systolic heart failure. She was started on Bumex and spironolactone per cardiology recommendation.  Outpatient cardiology follow-up was recommended.    Nurse reported that patient has been declining Bumex and spironolactone for 2 days.  Resident feels that taking Bumex 2 mg in the morning and 1 mg in the afternoon is too much and also taking spironolactone causes her to have \"debilitating cramps\"  At today's visit, she reported chronic SOB with exertion, chronic chest pain with activities. Denied dizziness, cough, nausea/vomiting. She reports eating and sleeping well.    Allergies, and PMH/PSH reviewed in EPIC today.  REVIEW OF SYSTEMS:  10 point ROS of systems including Constitutional, Eyes, Respiratory, Cardiovascular, Gastroenterology, Genitourinary, Integumentary, Musculoskeletal, Psychiatric were all negative except for pertinent positives noted in my HPI.    Objective:   /84   Pulse 90   Temp 98.3  F (36.8  C)   Resp 19   Ht 1.575 m (5' 2\")   Wt 128.4 kg (283 lb)   SpO2 99%   BMI 51.76 kg/m      Physical Exam:  GENERAL APPEARANCE:  Alert, in no distress, oriented, cooperative.   ENT:  Mouth and posterior oropharynx normal, moist mucous membranes  EYES:  EOM, conjunctivae, lids, pupils and irises normal  NECK:  No adenopathy,masses or thyromegaly  RESP:  respiratory effort and palpation of chest normal, lungs clear to auscultation , no respiratory distress  CV:  Palpation " and auscultation of heart done , regular rate and rhythm, no murmur, rub, or gallop, no edema to LE  GI/ABDOMEN:  normal bowel sounds, soft, nontender, no hepatosplenomegaly or other masses  M/S:   moves extremities spontaneously. Ambulation not tested   SKIN:  no rashes to exposed skin.   NEURO:   intact   PSYCH:  oriented X 3, normal insight,  affect and mood normal,       Recent labs in [facility] reviewed by me today.  and Most Recent 3 CBC's:  Recent Labs   Lab Test 05/01/24  0722 11/21/22  1059 09/02/22  1024   WBC 7.0 4.5 4.0   HGB 11.8 11.5* 10.0*   MCV 84 78 73*    345 355     Most Recent 3 BMP's:  Recent Labs   Lab Test 05/21/25  0517 09/10/24  0625 04/26/24  0802    139 139   POTASSIUM 4.9 3.9 4.6   CHLORIDE 102 101 107   CO2 26 24 24   BUN 29.4* 36.6* 18.4   CR 1.08* 1.11* 0.80   ANIONGAP 12 14 8   MAGGIE 10.8* 11.5* 12.6*   * 121* 96     Most Recent 2 LFT's:  Recent Labs   Lab Test 12/18/23  0704   AST 24   ALT 8   ALKPHOS 154*   BILITOTAL 0.7       Assessment/Plan:  Dyspnea on exertion  Chronic diastolic heart failure (H)  At discharge, dry weight was noted to be 291 lbs.  Weight on admission to facility was 293 lbs.  Weight today 278 lbs. Resident has been refusing the afternoon dose of Bumex and Spironolactone as she's feeling that she's too dry and it's been causing her some muscle cramping. We discussed her current weight in comparison to her dry weight and that we can stop the afternoon dose of Bumex and spironolactone and monitor her weight and kidney function. BMP, CBC had been ordered for 5/30/27.    Type 2 diabetes mellitus without complication, without long-term current use of insulin (H)  Hbg A1C 6.3% on 2/11/25.   Her home empagliflozin 10 mg daily was placed on hold. Will continue to monitor      Permanent atrial fibrillation (H)  S/p PPM & AVN ablation. She will continue on Eliquis 5 mg BID for anticoagulation.    Benign hypertension with chronic kidney disease, stage III  (H)  BP is stable. BP has been ranging 113 - 148 systolic and 62 - 84 diastolic. She will continue on Losartan 12.5 mg daily and Metoprolol 50 mg BID.  BUN 29.4, Cr 1.08, eGFR 55 on 5/21/25  BMP and CBC pending on 5/30/25    Generalized muscle weakness  Limited mobility  Continue PT/OT          Orders:   - Discontinue Bumex 1mg in the afternoon   - Discontinue Spironolactone 25 mg daily.       Electronically signed by: KRISTA Russo CNP DNP      Sincerely,        KRISTA Summers CNP    Electronically signed

## 2025-05-27 NOTE — PROGRESS NOTES
"[facility] GERIATRICS    Chief Complaint   Patient presents with    Follow Up     HPI:  Felisa Vasquez is a 70 year old  (1955), who is being seen today for an episodic care visit at: [facility] ([facility]) [[id number]].  She has past medical history significant for chronic systolic heart failure hypertension, hyperlipidemia, rheumatic mitral valve, moderate atrial regurgitation.  She was recently hospitalized for acute on chronic systolic heart failure. She was started on Bumex and spironolactone per cardiology recommendation.  Outpatient cardiology follow-up was recommended.    Nurse reported that patient has been declining Bumex and spironolactone for 2 days.  Resident feels that taking Bumex 2 mg in the morning and 1 mg in the afternoon is too much and also taking spironolactone causes her to have \"debilitating cramps\"  At today's visit, she reported chronic SOB with exertion, chronic chest pain with activities. Denied dizziness, cough, nausea/vomiting. She reports eating and sleeping well.    Allergies, and PMH/PSH reviewed in EPIC today.  REVIEW OF SYSTEMS:  10 point ROS of systems including Constitutional, Eyes, Respiratory, Cardiovascular, Gastroenterology, Genitourinary, Integumentary, Musculoskeletal, Psychiatric were all negative except for pertinent positives noted in my HPI.    Objective:   /84   Pulse 90   Temp 98.3  F (36.8  C)   Resp 19   Ht 1.575 m (5' 2\")   Wt 128.4 kg (283 lb)   SpO2 99%   BMI 51.76 kg/m      Physical Exam:  GENERAL APPEARANCE:  Alert, in no distress, oriented, cooperative.   ENT:  Mouth and posterior oropharynx normal, moist mucous membranes  EYES:  EOM, conjunctivae, lids, pupils and irises normal  NECK:  No adenopathy,masses or thyromegaly  RESP:  respiratory effort and palpation of chest normal, lungs clear to auscultation , no respiratory distress  CV:  Palpation and auscultation of heart done , regular rate and rhythm, no murmur, rub, or " gallop, no edema to LE  GI/ABDOMEN:  normal bowel sounds, soft, nontender, no hepatosplenomegaly or other masses  M/S:   moves extremities spontaneously. Ambulation not tested   SKIN:  no rashes to exposed skin.   NEURO:   intact   PSYCH:  oriented X 3, normal insight,  affect and mood normal,       Recent labs in [facility] reviewed by me today.  and Most Recent 3 CBC's:  Recent Labs   Lab Test 05/01/24  0722 11/21/22  1059 09/02/22  1024   WBC 7.0 4.5 4.0   HGB 11.8 11.5* 10.0*   MCV 84 78 73*    345 355     Most Recent 3 BMP's:  Recent Labs   Lab Test 05/21/25  0517 09/10/24  0625 04/26/24  0802    139 139   POTASSIUM 4.9 3.9 4.6   CHLORIDE 102 101 107   CO2 26 24 24   BUN 29.4* 36.6* 18.4   CR 1.08* 1.11* 0.80   ANIONGAP 12 14 8   MAGGIE 10.8* 11.5* 12.6*   * 121* 96     Most Recent 2 LFT's:  Recent Labs   Lab Test 12/18/23  0704   AST 24   ALT 8   ALKPHOS 154*   BILITOTAL 0.7       Assessment/Plan:  Dyspnea on exertion  Chronic diastolic heart failure (H)  At discharge, dry weight was noted to be 291 lbs.  Weight on admission to facility was 293 lbs.  Weight today 278 lbs. Resident has been refusing the afternoon dose of Bumex and Spironolactone as she's feeling that she's too dry and it's been causing her some muscle cramping. We discussed her current weight in comparison to her dry weight and that we can stop the afternoon dose of Bumex and spironolactone and monitor her weight and kidney function. BMP, CBC had been ordered for 5/30/27.    Type 2 diabetes mellitus without complication, without long-term current use of insulin (H)  Hbg A1C 6.3% on 2/11/25.   Her home empagliflozin 10 mg daily was placed on hold. Will continue to monitor      Permanent atrial fibrillation (H)  S/p PPM & AVN ablation. She will continue on Eliquis 5 mg BID for anticoagulation.    Benign hypertension with chronic kidney disease, stage III (H)  BP is stable. BP has been ranging 113 - 148 systolic and 62 - 84 diastolic.  She will continue on Losartan 12.5 mg daily and Metoprolol 50 mg BID.  BUN 29.4, Cr 1.08, eGFR 55 on 5/21/25  BMP and CBC pending on 5/30/25    Generalized muscle weakness  Limited mobility  Continue PT/OT          Orders:   - Discontinue Bumex 1mg in the afternoon   - Discontinue Spironolactone 25 mg daily.       Electronically signed by: Dr. Dara Olvera, APRN CNP DNP

## 2025-05-30 ENCOUNTER — RESULTS FOLLOW-UP (OUTPATIENT)
Dept: GERIATRICS | Facility: CLINIC | Age: 70
End: 2025-05-30

## 2025-05-30 LAB
ANION GAP SERPL CALCULATED.3IONS-SCNC: 9 MMOL/L (ref 7–15)
BUN SERPL-MCNC: 33.3 MG/DL (ref 8–23)
CALCIUM SERPL-MCNC: 10.7 MG/DL (ref 8.8–10.4)
CHLORIDE SERPL-SCNC: 104 MMOL/L (ref 98–107)
CREAT SERPL-MCNC: 1.14 MG/DL (ref 0.51–0.95)
EGFRCR SERPLBLD CKD-EPI 2021: 52 ML/MIN/1.73M2
ERYTHROCYTE [DISTWIDTH] IN BLOOD BY AUTOMATED COUNT: 17.7 % (ref 10–15)
GLUCOSE SERPL-MCNC: 164 MG/DL (ref 70–99)
HCO3 SERPL-SCNC: 24 MMOL/L (ref 22–29)
HCT VFR BLD AUTO: 41.8 % (ref 35–47)
HGB BLD-MCNC: 12.4 G/DL (ref 11.7–15.7)
MCH RBC QN AUTO: 25.4 PG (ref 26.5–33)
MCHC RBC AUTO-ENTMCNC: 29.7 G/DL (ref 31.5–36.5)
MCV RBC AUTO: 86 FL (ref 78–100)
PLATELET # BLD AUTO: 196 10E3/UL (ref 150–450)
POTASSIUM SERPL-SCNC: 4.8 MMOL/L (ref 3.4–5.3)
RBC # BLD AUTO: 4.89 10E6/UL (ref 3.8–5.2)
SODIUM SERPL-SCNC: 137 MMOL/L (ref 135–145)
WBC # BLD AUTO: 5 10E3/UL (ref 4–11)

## 2025-05-30 PROCEDURE — 85027 COMPLETE CBC AUTOMATED: CPT | Mod: ORL | Performed by: NURSE PRACTITIONER

## 2025-05-30 PROCEDURE — 36415 COLL VENOUS BLD VENIPUNCTURE: CPT | Mod: ORL | Performed by: NURSE PRACTITIONER

## 2025-05-30 PROCEDURE — P9604 ONE-WAY ALLOW PRORATED TRIP: HCPCS | Mod: ORL | Performed by: NURSE PRACTITIONER

## 2025-05-30 PROCEDURE — 80048 BASIC METABOLIC PNL TOTAL CA: CPT | Mod: ORL | Performed by: NURSE PRACTITIONER
